# Patient Record
Sex: FEMALE | Race: ASIAN | NOT HISPANIC OR LATINO | URBAN - METROPOLITAN AREA
[De-identification: names, ages, dates, MRNs, and addresses within clinical notes are randomized per-mention and may not be internally consistent; named-entity substitution may affect disease eponyms.]

---

## 2022-01-17 ENCOUNTER — INPATIENT (INPATIENT)
Facility: HOSPITAL | Age: 62
LOS: 9 days | Discharge: HOME | DRG: 177 | End: 2022-01-27
Attending: STUDENT IN AN ORGANIZED HEALTH CARE EDUCATION/TRAINING PROGRAM | Admitting: FAMILY MEDICINE
Payer: MEDICAID

## 2022-01-17 VITALS
HEIGHT: 65 IN | SYSTOLIC BLOOD PRESSURE: 170 MMHG | DIASTOLIC BLOOD PRESSURE: 82 MMHG | HEART RATE: 82 BPM | WEIGHT: 169.98 LBS | TEMPERATURE: 99 F | OXYGEN SATURATION: 85 % | RESPIRATION RATE: 16 BRPM

## 2022-01-17 DIAGNOSIS — U07.1 COVID-19: ICD-10-CM

## 2022-01-17 DIAGNOSIS — I10 ESSENTIAL (PRIMARY) HYPERTENSION: ICD-10-CM

## 2022-01-17 DIAGNOSIS — E11.9 TYPE 2 DIABETES MELLITUS WITHOUT COMPLICATIONS: ICD-10-CM

## 2022-01-17 DIAGNOSIS — J96.01 ACUTE RESPIRATORY FAILURE WITH HYPOXIA: ICD-10-CM

## 2022-01-17 DIAGNOSIS — E78.5 HYPERLIPIDEMIA, UNSPECIFIED: ICD-10-CM

## 2022-01-17 DIAGNOSIS — Z29.9 ENCOUNTER FOR PROPHYLACTIC MEASURES, UNSPECIFIED: ICD-10-CM

## 2022-01-17 LAB
ALBUMIN SERPL ELPH-MCNC: 2.7 G/DL — LOW (ref 3.3–5)
ALBUMIN SERPL ELPH-MCNC: 2.8 G/DL — LOW (ref 3.3–5)
ALP SERPL-CCNC: 51 U/L — SIGNIFICANT CHANGE UP (ref 40–120)
ALP SERPL-CCNC: 52 U/L — SIGNIFICANT CHANGE UP (ref 40–120)
ALT FLD-CCNC: 48 U/L — SIGNIFICANT CHANGE UP (ref 12–78)
ALT FLD-CCNC: 50 U/L — SIGNIFICANT CHANGE UP (ref 12–78)
ANION GAP SERPL CALC-SCNC: 5 MMOL/L — SIGNIFICANT CHANGE UP (ref 5–17)
APPEARANCE UR: CLEAR — SIGNIFICANT CHANGE UP
APTT BLD: 30.2 SEC — SIGNIFICANT CHANGE UP (ref 27.5–35.5)
AST SERPL-CCNC: 87 U/L — HIGH (ref 15–37)
AST SERPL-CCNC: 87 U/L — HIGH (ref 15–37)
B PERT DNA SPEC QL NAA+PROBE: SIGNIFICANT CHANGE UP
BASOPHILS # BLD AUTO: 0 K/UL — SIGNIFICANT CHANGE UP (ref 0–0.2)
BASOPHILS NFR BLD AUTO: 0 % — SIGNIFICANT CHANGE UP (ref 0–2)
BILIRUB DIRECT SERPL-MCNC: 0.1 MG/DL — SIGNIFICANT CHANGE UP (ref 0–0.3)
BILIRUB INDIRECT FLD-MCNC: 0.2 MG/DL — SIGNIFICANT CHANGE UP (ref 0.2–1)
BILIRUB SERPL-MCNC: 0.3 MG/DL — SIGNIFICANT CHANGE UP (ref 0.2–1.2)
BILIRUB SERPL-MCNC: 0.4 MG/DL — SIGNIFICANT CHANGE UP (ref 0.2–1.2)
BILIRUB UR-MCNC: NEGATIVE — SIGNIFICANT CHANGE UP
BUN SERPL-MCNC: 13 MG/DL — SIGNIFICANT CHANGE UP (ref 7–23)
C PNEUM DNA SPEC QL NAA+PROBE: SIGNIFICANT CHANGE UP
CALCIUM SERPL-MCNC: 8.8 MG/DL — SIGNIFICANT CHANGE UP (ref 8.5–10.1)
CHLORIDE SERPL-SCNC: 104 MMOL/L — SIGNIFICANT CHANGE UP (ref 96–108)
CO2 SERPL-SCNC: 26 MMOL/L — SIGNIFICANT CHANGE UP (ref 22–31)
COLOR SPEC: SIGNIFICANT CHANGE UP
CREAT SERPL-MCNC: 0.98 MG/DL — SIGNIFICANT CHANGE UP (ref 0.5–1.3)
CREAT SERPL-MCNC: 1 MG/DL — SIGNIFICANT CHANGE UP (ref 0.5–1.3)
D DIMER BLD IA.RAPID-MCNC: 624 NG/ML DDU — HIGH
DIFF PNL FLD: ABNORMAL
EOSINOPHIL # BLD AUTO: 0 K/UL — SIGNIFICANT CHANGE UP (ref 0–0.5)
EOSINOPHIL NFR BLD AUTO: 0 % — SIGNIFICANT CHANGE UP (ref 0–6)
FERRITIN SERPL-MCNC: 882 NG/ML — HIGH (ref 15–150)
FLUAV H1 2009 PAND RNA SPEC QL NAA+PROBE: SIGNIFICANT CHANGE UP
FLUAV H1 RNA SPEC QL NAA+PROBE: SIGNIFICANT CHANGE UP
FLUAV H3 RNA SPEC QL NAA+PROBE: SIGNIFICANT CHANGE UP
FLUAV SUBTYP SPEC NAA+PROBE: SIGNIFICANT CHANGE UP
FLUBV RNA SPEC QL NAA+PROBE: SIGNIFICANT CHANGE UP
GLUCOSE SERPL-MCNC: 93 MG/DL — SIGNIFICANT CHANGE UP (ref 70–99)
GLUCOSE UR QL: NEGATIVE — SIGNIFICANT CHANGE UP
HADV DNA SPEC QL NAA+PROBE: SIGNIFICANT CHANGE UP
HCOV PNL SPEC NAA+PROBE: SIGNIFICANT CHANGE UP
HCT VFR BLD CALC: 37.4 % — SIGNIFICANT CHANGE UP (ref 34.5–45)
HGB BLD-MCNC: 13 G/DL — SIGNIFICANT CHANGE UP (ref 11.5–15.5)
HMPV RNA SPEC QL NAA+PROBE: SIGNIFICANT CHANGE UP
HPIV1 RNA SPEC QL NAA+PROBE: SIGNIFICANT CHANGE UP
HPIV2 RNA SPEC QL NAA+PROBE: SIGNIFICANT CHANGE UP
HPIV3 RNA SPEC QL NAA+PROBE: SIGNIFICANT CHANGE UP
HPIV4 RNA SPEC QL NAA+PROBE: SIGNIFICANT CHANGE UP
INR BLD: 0.97 RATIO — SIGNIFICANT CHANGE UP (ref 0.88–1.16)
INR BLD: 1.03 RATIO — SIGNIFICANT CHANGE UP (ref 0.88–1.16)
KETONES UR-MCNC: NEGATIVE — SIGNIFICANT CHANGE UP
LACTATE SERPL-SCNC: 0.9 MMOL/L — SIGNIFICANT CHANGE UP (ref 0.7–2)
LEUKOCYTE ESTERASE UR-ACNC: NEGATIVE — SIGNIFICANT CHANGE UP
LYMPHOCYTES # BLD AUTO: 0.3 K/UL — LOW (ref 1–3.3)
LYMPHOCYTES # BLD AUTO: 6 % — LOW (ref 13–44)
MCHC RBC-ENTMCNC: 30.5 PG — SIGNIFICANT CHANGE UP (ref 27–34)
MCHC RBC-ENTMCNC: 34.8 GM/DL — SIGNIFICANT CHANGE UP (ref 32–36)
MCV RBC AUTO: 87.8 FL — SIGNIFICANT CHANGE UP (ref 80–100)
MONOCYTES # BLD AUTO: 0.1 K/UL — SIGNIFICANT CHANGE UP (ref 0–0.9)
MONOCYTES NFR BLD AUTO: 2 % — SIGNIFICANT CHANGE UP (ref 2–14)
NEUTROPHILS # BLD AUTO: 4.53 K/UL — SIGNIFICANT CHANGE UP (ref 1.8–7.4)
NEUTROPHILS NFR BLD AUTO: 83 % — HIGH (ref 43–77)
NITRITE UR-MCNC: NEGATIVE — SIGNIFICANT CHANGE UP
NRBC # BLD: SIGNIFICANT CHANGE UP /100 WBCS (ref 0–0)
PH UR: 7 — SIGNIFICANT CHANGE UP (ref 5–8)
PLATELET # BLD AUTO: 169 K/UL — SIGNIFICANT CHANGE UP (ref 150–400)
POTASSIUM SERPL-MCNC: 3.7 MMOL/L — SIGNIFICANT CHANGE UP (ref 3.5–5.3)
POTASSIUM SERPL-SCNC: 3.7 MMOL/L — SIGNIFICANT CHANGE UP (ref 3.5–5.3)
PROCALCITONIN SERPL-MCNC: 0.08 NG/ML — HIGH (ref 0–0.04)
PROT SERPL-MCNC: 7 G/DL — SIGNIFICANT CHANGE UP (ref 6–8.3)
PROT SERPL-MCNC: 7.1 G/DL — SIGNIFICANT CHANGE UP (ref 6–8.3)
PROT UR-MCNC: 100
PROTHROM AB SERPL-ACNC: 11.4 SEC — SIGNIFICANT CHANGE UP (ref 10.6–13.6)
PROTHROM AB SERPL-ACNC: 12 SEC — SIGNIFICANT CHANGE UP (ref 10.6–13.6)
RAPID RVP RESULT: DETECTED
RBC # BLD: 4.26 M/UL — SIGNIFICANT CHANGE UP (ref 3.8–5.2)
RBC # FLD: 13 % — SIGNIFICANT CHANGE UP (ref 10.3–14.5)
RV+EV RNA SPEC QL NAA+PROBE: SIGNIFICANT CHANGE UP
SARS-COV-2 RNA SPEC QL NAA+PROBE: DETECTED
SODIUM SERPL-SCNC: 135 MMOL/L — SIGNIFICANT CHANGE UP (ref 135–145)
SP GR SPEC: 1 — LOW (ref 1.01–1.02)
TROPONIN I, HIGH SENSITIVITY RESULT: 19.8 NG/L — SIGNIFICANT CHANGE UP
UROBILINOGEN FLD QL: NEGATIVE — SIGNIFICANT CHANGE UP
WBC # BLD: 4.98 K/UL — SIGNIFICANT CHANGE UP (ref 3.8–10.5)
WBC # FLD AUTO: 4.98 K/UL — SIGNIFICANT CHANGE UP (ref 3.8–10.5)

## 2022-01-17 PROCEDURE — 71045 X-RAY EXAM CHEST 1 VIEW: CPT | Mod: 26

## 2022-01-17 PROCEDURE — 99285 EMERGENCY DEPT VISIT HI MDM: CPT

## 2022-01-17 PROCEDURE — 71275 CT ANGIOGRAPHY CHEST: CPT | Mod: 26,MA

## 2022-01-17 PROCEDURE — 99223 1ST HOSP IP/OBS HIGH 75: CPT

## 2022-01-17 RX ORDER — INSULIN LISPRO 100/ML
VIAL (ML) SUBCUTANEOUS AT BEDTIME
Refills: 0 | Status: DISCONTINUED | OUTPATIENT
Start: 2022-01-17 | End: 2022-01-22

## 2022-01-17 RX ORDER — AMLODIPINE BESYLATE 2.5 MG/1
1 TABLET ORAL
Qty: 0 | Refills: 0 | DISCHARGE

## 2022-01-17 RX ORDER — SODIUM CHLORIDE 9 MG/ML
1000 INJECTION, SOLUTION INTRAVENOUS
Refills: 0 | Status: DISCONTINUED | OUTPATIENT
Start: 2022-01-17 | End: 2022-01-27

## 2022-01-17 RX ORDER — DEXTROSE 50 % IN WATER 50 %
25 SYRINGE (ML) INTRAVENOUS ONCE
Refills: 0 | Status: DISCONTINUED | OUTPATIENT
Start: 2022-01-17 | End: 2022-01-27

## 2022-01-17 RX ORDER — DEXAMETHASONE 0.5 MG/5ML
6 ELIXIR ORAL ONCE
Refills: 0 | Status: COMPLETED | OUTPATIENT
Start: 2022-01-17 | End: 2022-01-17

## 2022-01-17 RX ORDER — DEXAMETHASONE 0.5 MG/5ML
6 ELIXIR ORAL DAILY
Refills: 0 | Status: COMPLETED | OUTPATIENT
Start: 2022-01-18 | End: 2022-01-26

## 2022-01-17 RX ORDER — METOPROLOL TARTRATE 50 MG
1 TABLET ORAL
Qty: 0 | Refills: 0 | DISCHARGE

## 2022-01-17 RX ORDER — REMDESIVIR 5 MG/ML
200 INJECTION INTRAVENOUS EVERY 24 HOURS
Refills: 0 | Status: COMPLETED | OUTPATIENT
Start: 2022-01-17 | End: 2022-01-17

## 2022-01-17 RX ORDER — GLUCAGON INJECTION, SOLUTION 0.5 MG/.1ML
1 INJECTION, SOLUTION SUBCUTANEOUS ONCE
Refills: 0 | Status: DISCONTINUED | OUTPATIENT
Start: 2022-01-17 | End: 2022-01-27

## 2022-01-17 RX ORDER — REMDESIVIR 5 MG/ML
INJECTION INTRAVENOUS
Refills: 0 | Status: COMPLETED | OUTPATIENT
Start: 2022-01-17 | End: 2022-01-21

## 2022-01-17 RX ORDER — AMLODIPINE BESYLATE 2.5 MG/1
10 TABLET ORAL DAILY
Refills: 0 | Status: DISCONTINUED | OUTPATIENT
Start: 2022-01-17 | End: 2022-01-27

## 2022-01-17 RX ORDER — ASPIRIN/CALCIUM CARB/MAGNESIUM 324 MG
1 TABLET ORAL
Qty: 0 | Refills: 0 | DISCHARGE

## 2022-01-17 RX ORDER — ENOXAPARIN SODIUM 100 MG/ML
40 INJECTION SUBCUTANEOUS DAILY
Refills: 0 | Status: DISCONTINUED | OUTPATIENT
Start: 2022-01-17 | End: 2022-01-18

## 2022-01-17 RX ORDER — LOSARTAN POTASSIUM 100 MG/1
1 TABLET, FILM COATED ORAL
Qty: 0 | Refills: 0 | DISCHARGE

## 2022-01-17 RX ORDER — DEXTROSE 50 % IN WATER 50 %
12.5 SYRINGE (ML) INTRAVENOUS ONCE
Refills: 0 | Status: DISCONTINUED | OUTPATIENT
Start: 2022-01-17 | End: 2022-01-27

## 2022-01-17 RX ORDER — LOSARTAN POTASSIUM 100 MG/1
100 TABLET, FILM COATED ORAL DAILY
Refills: 0 | Status: DISCONTINUED | OUTPATIENT
Start: 2022-01-17 | End: 2022-01-23

## 2022-01-17 RX ORDER — SODIUM CHLORIDE 9 MG/ML
1750 INJECTION INTRAMUSCULAR; INTRAVENOUS; SUBCUTANEOUS ONCE
Refills: 0 | Status: COMPLETED | OUTPATIENT
Start: 2022-01-17 | End: 2022-01-17

## 2022-01-17 RX ORDER — ASPIRIN/CALCIUM CARB/MAGNESIUM 324 MG
0 TABLET ORAL
Qty: 0 | Refills: 0 | DISCHARGE

## 2022-01-17 RX ORDER — INSULIN LISPRO 100/ML
VIAL (ML) SUBCUTANEOUS
Refills: 0 | Status: DISCONTINUED | OUTPATIENT
Start: 2022-01-17 | End: 2022-01-22

## 2022-01-17 RX ORDER — ACETAMINOPHEN 500 MG
650 TABLET ORAL ONCE
Refills: 0 | Status: COMPLETED | OUTPATIENT
Start: 2022-01-17 | End: 2022-01-17

## 2022-01-17 RX ORDER — METOPROLOL TARTRATE 50 MG
50 TABLET ORAL
Refills: 0 | Status: DISCONTINUED | OUTPATIENT
Start: 2022-01-17 | End: 2022-01-27

## 2022-01-17 RX ORDER — SIMVASTATIN 20 MG/1
1 TABLET, FILM COATED ORAL
Qty: 0 | Refills: 0 | DISCHARGE

## 2022-01-17 RX ORDER — SITAGLIPTIN 50 MG/1
1 TABLET, FILM COATED ORAL
Qty: 0 | Refills: 0 | DISCHARGE

## 2022-01-17 RX ORDER — PIOGLITAZONE HYDROCHLORIDE 15 MG/1
1 TABLET ORAL
Qty: 0 | Refills: 0 | DISCHARGE

## 2022-01-17 RX ORDER — ASPIRIN/CALCIUM CARB/MAGNESIUM 324 MG
81 TABLET ORAL DAILY
Refills: 0 | Status: DISCONTINUED | OUTPATIENT
Start: 2022-01-17 | End: 2022-01-27

## 2022-01-17 RX ORDER — DEXTROSE 50 % IN WATER 50 %
15 SYRINGE (ML) INTRAVENOUS ONCE
Refills: 0 | Status: DISCONTINUED | OUTPATIENT
Start: 2022-01-17 | End: 2022-01-27

## 2022-01-17 RX ORDER — HYDROCHLOROTHIAZIDE 25 MG
25 TABLET ORAL DAILY
Refills: 0 | Status: DISCONTINUED | OUTPATIENT
Start: 2022-01-17 | End: 2022-01-23

## 2022-01-17 RX ORDER — REMDESIVIR 5 MG/ML
100 INJECTION INTRAVENOUS EVERY 24 HOURS
Refills: 0 | Status: COMPLETED | OUTPATIENT
Start: 2022-01-18 | End: 2022-01-21

## 2022-01-17 RX ADMIN — Medication 6 MILLIGRAM(S): at 14:50

## 2022-01-17 RX ADMIN — REMDESIVIR 500 MILLIGRAM(S): 5 INJECTION INTRAVENOUS at 21:05

## 2022-01-17 RX ADMIN — Medication 650 MILLIGRAM(S): at 14:50

## 2022-01-17 RX ADMIN — Medication 650 MILLIGRAM(S): at 18:12

## 2022-01-17 RX ADMIN — SODIUM CHLORIDE 1750 MILLILITER(S): 9 INJECTION INTRAMUSCULAR; INTRAVENOUS; SUBCUTANEOUS at 14:50

## 2022-01-17 NOTE — H&P ADULT - HISTORY OF PRESENT ILLNESS
60 yo F with pmh Dm2 on insulin. HTN, HLD who presents with 1 week of dry cough, intermittent chest pain induced by coughing, myalgias and temps of 100. Pt states her son is also covid +. States she has note received covid vaccine nor wants it if offered as for Restorationist reasons as a Church her priests have told her not to get it. Pt denies any other acute complaints at this time.     In the ED pt found to be hypoxic in the 80s, started on O2 supplementation, satting 91% on 5.5 L of NC O2 with some mild wob on exertion. CXR c/w COVID PNA. CTA neg for PE 62 yo obese F with pmh Dm2 on insulin. HTN, HLD who presents with 1 week of dry cough, intermittent chest pain induced by coughing, myalgias and temps of 100. Pt states her son is also covid +. States she has note received covid vaccine nor wants it if offered as for Spiritism reasons as a Jain her priests have told her not to get it. Pt denies any other acute complaints at this time.     In the ED pt found to be hypoxic in the 80s, started on O2 supplementation, satting 91% on 5.5 L of NC O2 with some mild wob on exertion. CXR c/w COVID PNA. CTA neg for PE

## 2022-01-17 NOTE — ED PROVIDER NOTE - IV ALTEPLASE INCLUSION HIDDEN
Patient told to  rx at  js left message js  
Pt need refill and please tell him he needs to set up his appt per his mommy  
show

## 2022-01-17 NOTE — ED PROVIDER NOTE - MUSCULOSKELETAL, MLM
Spine appears normal, range of motion is not limited, no muscle or joint tenderness. BLE no edema or tenderness.

## 2022-01-17 NOTE — ED PROVIDER NOTE - ATTENDING CONTRIBUTION TO CARE
Pt is a 62 yo female who presents to the ED with a cc of viral URI symptoms. PMHx of DM, HTN. Pt reports that for the last week she has been experiencing weakness, fatigue, fevers, chills, N/D, chest discomfort, SOB, cough, and lower abd pain. She reports that her son whom she lives with tested positive for COVID with an at home test. She has been taking Tylenol and Advil for her symptoms with no real improvement. She noted increased SOB and so came to the ED. On arrival pt ws found to be hypoxic. Denies V/C, ext numbness or weakness. Pt has not been vaccinated for COVID 19. On exam pt lying in bed appears fatigued but in NAD, NCAT, dry MM, heart RR, lungs CTA, abd soft NT/ND. No focal neurological deficits. Pt presenting with hypoxia in the setting of suspected COVID 19. Will obtain screening labs, chest x-ray, EKG. Will swab for COVID 19 and monitor

## 2022-01-17 NOTE — H&P ADULT - ASSESSMENT
62 yo obese F with pmh Dm2 on insulin. HTN, HLD who presents with 1 week of dry cough, intermittent chest pain induced by coughing, myalgias admitted with acute respiratory failure with hypoxia sec to COVID PNA

## 2022-01-17 NOTE — H&P ADULT - NSICDXPASTMEDICALHX_GEN_ALL_CORE_FT
PAST MEDICAL HISTORY:  DM (diabetes mellitus) type 2-insulin dependenct    HTN (hypertension) resistant type,, >3 bp meds

## 2022-01-17 NOTE — ED ADULT NURSE REASSESSMENT NOTE - NS ED NURSE REASSESS COMMENT FT1
Pt is AOx4 has covid on nasal cannula 5L. Pt states she feels a little SOB with a sore throat and a cough. Pt states she is comfortable though. No s/s of distress noted at this time. VSS will reassess.

## 2022-01-17 NOTE — H&P ADULT - NSHPADDITIONALINFOADULT_GEN_ALL_CORE
r/o acs as pt endorsing some chest pressure=check 2nd trop, repeat ekg, monitor on tele, consider cardio if persists

## 2022-01-17 NOTE — ED PROVIDER NOTE - OBJECTIVE STATEMENT
Rikki [id number]    61 F hx HTN, DM presents to ED for low O2 (80s), cough, SOB. States has been sick for the past week. Fever, CP, SOB, nausea, diarrhea, cough. Son at home with suspected COVID infection. Pt has been taking tylenol/advil for symptoms, none today. Nonsmoker.    No COVID vaccine  PMD Sung

## 2022-01-17 NOTE — H&P ADULT - PROBLEM SELECTOR PLAN 2
pt unvacinnated  cont decadron  apprec ID recs Dr Paredes  apprec pulm recs dr dunn  monitor resp course  start remdesmivir  assume today is day 7 as one week of onset of symptoms

## 2022-01-17 NOTE — H&P ADULT - PROBLEM SELECTOR PLAN 4
chronic, stable, resistant type 4 types of antihtn at home  cont home meds with hold parameters  monitor renal function  dash diet

## 2022-01-17 NOTE — H&P ADULT - NSICDXFAMILYHX_GEN_ALL_CORE_FT
FAMILY HISTORY:  Father  Still living? Unknown  FH: HTN (hypertension), Age at diagnosis: Age Unknown  FHx: type 2 diabetes mellitus, Age at diagnosis: Age Unknown    Mother  Still living? No  FH: HTN (hypertension), Age at diagnosis: Age Unknown  FHx: type 2 diabetes mellitus, Age at diagnosis: Age Unknown

## 2022-01-17 NOTE — ED ADULT NURSE NOTE - OBJECTIVE STATEMENT
Received the patient in the Er. c/O Fever, cough and dry mouth. Son is diagnosed with covid positive. unvaccinated.

## 2022-01-17 NOTE — H&P ADULT - NSHPSOCIALHISTORY_GEN_ALL_CORE
lives in a home in [place] with her two children in their 20s to 30s, nonsmoker, denies etoh use, denies ilicit drug use, retired , , has not had the flu shot, thinks she may have a PNA vax, declines covid vaccine for Christian reasons and not interested in pursuing

## 2022-01-18 LAB
A1C WITH ESTIMATED AVERAGE GLUCOSE RESULT: 6.7 % — HIGH (ref 4–5.6)
ALBUMIN SERPL ELPH-MCNC: 2.5 G/DL — LOW (ref 3.3–5)
ALBUMIN SERPL ELPH-MCNC: 2.5 G/DL — LOW (ref 3.3–5)
ALP SERPL-CCNC: 50 U/L — SIGNIFICANT CHANGE UP (ref 40–120)
ALP SERPL-CCNC: 50 U/L — SIGNIFICANT CHANGE UP (ref 40–120)
ALT FLD-CCNC: 48 U/L — SIGNIFICANT CHANGE UP (ref 12–78)
ALT FLD-CCNC: 49 U/L — SIGNIFICANT CHANGE UP (ref 12–78)
ANION GAP SERPL CALC-SCNC: 6 MMOL/L — SIGNIFICANT CHANGE UP (ref 5–17)
AST SERPL-CCNC: 81 U/L — HIGH (ref 15–37)
AST SERPL-CCNC: 87 U/L — HIGH (ref 15–37)
BASOPHILS # BLD AUTO: 0 K/UL — SIGNIFICANT CHANGE UP (ref 0–0.2)
BASOPHILS NFR BLD AUTO: 0 % — SIGNIFICANT CHANGE UP (ref 0–2)
BILIRUB DIRECT SERPL-MCNC: <0.1 MG/DL — SIGNIFICANT CHANGE UP (ref 0–0.3)
BILIRUB INDIRECT FLD-MCNC: >0.2 MG/DL — SIGNIFICANT CHANGE UP (ref 0.2–1)
BILIRUB SERPL-MCNC: 0.3 MG/DL — SIGNIFICANT CHANGE UP (ref 0.2–1.2)
BILIRUB SERPL-MCNC: 0.4 MG/DL — SIGNIFICANT CHANGE UP (ref 0.2–1.2)
BUN SERPL-MCNC: 16 MG/DL — SIGNIFICANT CHANGE UP (ref 7–23)
CALCIUM SERPL-MCNC: 8.9 MG/DL — SIGNIFICANT CHANGE UP (ref 8.5–10.1)
CHLORIDE SERPL-SCNC: 108 MMOL/L — SIGNIFICANT CHANGE UP (ref 96–108)
CO2 SERPL-SCNC: 25 MMOL/L — SIGNIFICANT CHANGE UP (ref 22–31)
CREAT SERPL-MCNC: 0.78 MG/DL — SIGNIFICANT CHANGE UP (ref 0.5–1.3)
CREAT SERPL-MCNC: 0.9 MG/DL — SIGNIFICANT CHANGE UP (ref 0.5–1.3)
CRP SERPL-MCNC: 46 MG/L — HIGH
CULTURE RESULTS: SIGNIFICANT CHANGE UP
EOSINOPHIL # BLD AUTO: 0 K/UL — SIGNIFICANT CHANGE UP (ref 0–0.5)
EOSINOPHIL NFR BLD AUTO: 0 % — SIGNIFICANT CHANGE UP (ref 0–6)
ESTIMATED AVERAGE GLUCOSE: 146 MG/DL — HIGH (ref 68–114)
GLUCOSE SERPL-MCNC: 118 MG/DL — HIGH (ref 70–99)
HCT VFR BLD CALC: 38.2 % — SIGNIFICANT CHANGE UP (ref 34.5–45)
HCV AB S/CO SERPL IA: 0.17 S/CO — SIGNIFICANT CHANGE UP (ref 0–0.99)
HCV AB SERPL-IMP: SIGNIFICANT CHANGE UP
HGB BLD-MCNC: 12.8 G/DL — SIGNIFICANT CHANGE UP (ref 11.5–15.5)
INR BLD: 1.02 RATIO — SIGNIFICANT CHANGE UP (ref 0.88–1.16)
LYMPHOCYTES # BLD AUTO: 0.28 K/UL — LOW (ref 1–3.3)
LYMPHOCYTES # BLD AUTO: 6 % — LOW (ref 13–44)
MCHC RBC-ENTMCNC: 30.1 PG — SIGNIFICANT CHANGE UP (ref 27–34)
MCHC RBC-ENTMCNC: 33.5 GM/DL — SIGNIFICANT CHANGE UP (ref 32–36)
MCV RBC AUTO: 89.9 FL — SIGNIFICANT CHANGE UP (ref 80–100)
MONOCYTES # BLD AUTO: 0.33 K/UL — SIGNIFICANT CHANGE UP (ref 0–0.9)
MONOCYTES NFR BLD AUTO: 7 % — SIGNIFICANT CHANGE UP (ref 2–14)
NEUTROPHILS # BLD AUTO: 3.95 K/UL — SIGNIFICANT CHANGE UP (ref 1.8–7.4)
NEUTROPHILS NFR BLD AUTO: 72 % — SIGNIFICANT CHANGE UP (ref 43–77)
NRBC # BLD: SIGNIFICANT CHANGE UP /100 WBCS (ref 0–0)
PLATELET # BLD AUTO: 175 K/UL — SIGNIFICANT CHANGE UP (ref 150–400)
POTASSIUM SERPL-MCNC: 4 MMOL/L — SIGNIFICANT CHANGE UP (ref 3.5–5.3)
POTASSIUM SERPL-SCNC: 4 MMOL/L — SIGNIFICANT CHANGE UP (ref 3.5–5.3)
PROT SERPL-MCNC: 6.7 G/DL — SIGNIFICANT CHANGE UP (ref 6–8.3)
PROT SERPL-MCNC: 6.8 G/DL — SIGNIFICANT CHANGE UP (ref 6–8.3)
PROTHROM AB SERPL-ACNC: 11.9 SEC — SIGNIFICANT CHANGE UP (ref 10.6–13.6)
RBC # BLD: 4.25 M/UL — SIGNIFICANT CHANGE UP (ref 3.8–5.2)
RBC # FLD: 13 % — SIGNIFICANT CHANGE UP (ref 10.3–14.5)
SODIUM SERPL-SCNC: 139 MMOL/L — SIGNIFICANT CHANGE UP (ref 135–145)
SPECIMEN SOURCE: SIGNIFICANT CHANGE UP
TROPONIN I, HIGH SENSITIVITY RESULT: 16.3 NG/L — SIGNIFICANT CHANGE UP
WBC # BLD: 4.65 K/UL — SIGNIFICANT CHANGE UP (ref 3.8–10.5)
WBC # FLD AUTO: 4.65 K/UL — SIGNIFICANT CHANGE UP (ref 3.8–10.5)

## 2022-01-18 PROCEDURE — 99232 SBSQ HOSP IP/OBS MODERATE 35: CPT

## 2022-01-18 PROCEDURE — 99223 1ST HOSP IP/OBS HIGH 75: CPT

## 2022-01-18 PROCEDURE — 93010 ELECTROCARDIOGRAM REPORT: CPT

## 2022-01-18 RX ORDER — SIMVASTATIN 20 MG/1
20 TABLET, FILM COATED ORAL AT BEDTIME
Refills: 0 | Status: DISCONTINUED | OUTPATIENT
Start: 2022-01-18 | End: 2022-01-27

## 2022-01-18 RX ORDER — ENOXAPARIN SODIUM 100 MG/ML
80 INJECTION SUBCUTANEOUS
Refills: 0 | Status: DISCONTINUED | OUTPATIENT
Start: 2022-01-19 | End: 2022-01-24

## 2022-01-18 RX ADMIN — Medication 200 MILLIGRAM(S): at 06:47

## 2022-01-18 RX ADMIN — Medication 4: at 12:12

## 2022-01-18 RX ADMIN — ENOXAPARIN SODIUM 40 MILLIGRAM(S): 100 INJECTION SUBCUTANEOUS at 12:12

## 2022-01-18 RX ADMIN — SIMVASTATIN 20 MILLIGRAM(S): 20 TABLET, FILM COATED ORAL at 21:58

## 2022-01-18 RX ADMIN — Medication 6 MILLIGRAM(S): at 06:46

## 2022-01-18 RX ADMIN — Medication 25 MILLIGRAM(S): at 06:46

## 2022-01-18 RX ADMIN — Medication 50 MILLIGRAM(S): at 16:52

## 2022-01-18 RX ADMIN — Medication 30 MILLILITER(S): at 17:35

## 2022-01-18 RX ADMIN — AMLODIPINE BESYLATE 10 MILLIGRAM(S): 2.5 TABLET ORAL at 06:46

## 2022-01-18 RX ADMIN — Medication 81 MILLIGRAM(S): at 12:12

## 2022-01-18 RX ADMIN — Medication 6: at 16:54

## 2022-01-18 RX ADMIN — LOSARTAN POTASSIUM 100 MILLIGRAM(S): 100 TABLET, FILM COATED ORAL at 06:47

## 2022-01-18 RX ADMIN — Medication 50 MILLIGRAM(S): at 06:46

## 2022-01-18 RX ADMIN — Medication 200 MILLIGRAM(S): at 16:52

## 2022-01-18 RX ADMIN — REMDESIVIR 500 MILLIGRAM(S): 5 INJECTION INTRAVENOUS at 21:58

## 2022-01-18 NOTE — PROGRESS NOTE ADULT - ASSESSMENT
62 yo obese F with pmh Dm2 on insulin. HTN, HLD who presents with 1 week of dry cough, intermittent chest pain induced by coughing, myalgias admitted with acute respiratory failure with hypoxia sec to COVID PNA       Problem/Plan - 1:  ·  Problem: Acute respiratory failure with hypoxia.   ·  Plan: monitor resp status  apprec pulm recs  maintain sats above 88% with O2 supplemental support.     Problem/Plan - 2:  ·  Problem: Pneumonia due to COVID-19 virus.   ·  Plan: pt unvacinnated  cont decadron  apprec ID recs Dr Paredes  apprec pulm recs dr dunn  start remdesmivir  assume today is day 7 as one week of onset of symptoms.     Problem/Plan - 3:  ·  Problem: Type 2 diabetes mellitus.   ·  Plan: chronic, stable  start mdiss with hypgolycemic protocol  diabetic diet.     Problem/Plan - 4:  ·  Problem: HTN (hypertension).   cont home meds with hold parameters  Amlodipine 10mg po daily   HCTZ 25mg po daily   Metoprolol 50mg po bid      Problem/Plan - 5:  ·  Problem: HLD (hyperlipidemia).   ·  Plan: chronic, stable  Aspirin 81mg daily   Simvastatin 20mg po daily      Problem/Plan - 6:  ·  Problem: Need for prophylactic measure.   ·  Plan: lovenox 40mg sq qd as per covid protocol.  Maalox for dyspeptic symptoms.   PT , Dc planning

## 2022-01-18 NOTE — CONSULT NOTE ADULT - TIME BILLING
Thank you for your consult.   Please call us with any concerns or questions.   We will continue to follow.     Frantz Darby MD   Division of Infectious Diseases  [facility]   Cell [phone] between 8am and 6pm   After 6pm and weekends please call ID service at [phone].

## 2022-01-18 NOTE — PROGRESS NOTE ADULT - SUBJECTIVE AND OBJECTIVE BOX
CC: Covid pneumonia  Acute hypoxic resp failure.   Still requiring 5L NC to maintain saturation above 90%  HPI:  60 yo obese F with pmh Dm2 on insulin. HTN, HLD who presents with 1 week of dry cough, intermittent chest pain induced by coughing, myalgias and temps of 100. Pt states her son is also covid +. States she has note received covid vaccine nor wants it if offered as for Jehovah's witness reasons as a Jain her priests have told her not to get it. Pt denies any other acute complaints at this time.     In the ED pt found to be hypoxic in the 80s, started on O2 supplementation, satting 91% on 5.5 L of NC O2 with some mild wob on exertion. CXR c/w COVID PNA. CTA neg for PE (2022 18:42)    REVIEW OF SYSTEMS:    Patient denied fever, chills, abdominal pain, nausea, vomiting, cough, shortness of breath, chest pain or palpitations    Vital Signs Last 24 Hrs  T(C): 36.9 (2022 11:32), Max: 37.1 (2022 20:57)  T(F): 98.4 (2022 11:32), Max: 98.8 (2022 20:57)  HR: 80 (2022 16:47) (75 - 80)  BP: 150/77 (2022 16:47) (148/72 - 164/74)  BP(mean): --  RR: 18 (2022 11:32) (18 - 18)  SpO2: 91% (2022 11:32) (91% - 94%)I&O's Summary    PHYSICAL EXAM:  GENERAL: NAD, well-groomed  HEENT: PERRL, +EOMI, anicteric, no [place]  NECK: Supple, No JVD   CHEST/LUNG: CTA bilaterally; Normal effort  HEART: S1S2 Normal intensity, no murmurs, gallops or rubs noted  ABDOMEN: Soft, BS Normoactive, NT, ND, no HSM noted  EXTREMITIES:  2+ radial and DP pulses noted, no clubbing, cyanosis, or edema noted, FROM x 4  SKIN: No rashes or lesions noted  NEURO: A&Ox3, no focal deficits noted, CN II-XII intact  PSYCH: Anxiety  mood and affect; insight/judgement appropriate  LABS:                        12.8   4.65  )-----------( 175      ( 2022 08:16 )             38.2     -18    139  |  108  |  16  ----------------------------<  118<H>  4.0   |  25  |  0.78    Ca    8.9      2022 08:16    TPro  6.8  /  Alb  2.5<L>  /  TBili  0.4  /  DBili  x   /  AST  87<H>  /  ALT  48  /  AlkPhos  50      PT/INR - ( 2022 01:01 )   PT: 11.9 sec;   INR: 1.02 ratio         PTT - ( 2022 14:09 )  PTT:30.2 sec  Urinalysis Basic - ( 2022 21:16 )    Color: Pale Yellow / Appearance: Clear / S.005 / pH: x  Gluc: x / Ketone: Negative  / Bili: Negative / Urobili: Negative   Blood: x / Protein: 100 / Nitrite: Negative   Leuk Esterase: Negative / RBC: 0-2 /HPF / WBC 0-2   Sq Epi: x / Non Sq Epi: Few / Bacteria: Occasional      RADIOLOGY & ADDITIONAL TESTS:    MEDICATIONS:  MEDICATIONS  (STANDING):  amLODIPine   Tablet 10 milliGRAM(s) Oral daily  aspirin enteric coated 81 milliGRAM(s) Oral daily  dexAMETHasone     Tablet 6 milliGRAM(s) Oral daily  dextrose 40% Gel 15 Gram(s) Oral once  dextrose 5%. 1000 milliLiter(s) (50 mL/Hr) IV Continuous <Continuous>  dextrose 5%. 1000 milliLiter(s) (100 mL/Hr) IV Continuous <Continuous>  dextrose 50% Injectable 25 Gram(s) IV Push once  dextrose 50% Injectable 12.5 Gram(s) IV Push once  dextrose 50% Injectable 25 Gram(s) IV Push once  enoxaparin Injectable 40 milliGRAM(s) SubCutaneous daily  glucagon  Injectable 1 milliGRAM(s) IntraMuscular once  hydrochlorothiazide 25 milliGRAM(s) Oral daily  insulin lispro (ADMELOG) corrective regimen sliding scale   SubCutaneous three times a day before meals  insulin lispro (ADMELOG) corrective regimen sliding scale   SubCutaneous at bedtime  losartan 100 milliGRAM(s) Oral daily  metoprolol tartrate 50 milliGRAM(s) Oral two times a day  remdesivir  IVPB 100 milliGRAM(s) IV Intermittent every 24 hours  remdesivir  IVPB   IV Intermittent     MEDICATIONS  (PRN):  aluminum hydroxide/magnesium hydroxide/simethicone Suspension 30 milliLiter(s) Oral every 4 hours PRN Dyspepsia  guaiFENesin Oral Liquid (Sugar-Free) 200 milliGRAM(s) Oral every 6 hours PRN Cough

## 2022-01-18 NOTE — PATIENT PROFILE ADULT - FALL HARM RISK - UNIVERSAL INTERVENTIONS
Bed in lowest position, wheels locked, appropriate side rails in place/Call bell, personal items and telephone in reach/Instruct patient to call for assistance before getting out of bed or chair/Non-slip footwear when patient is out of bed/[place] to call system/Physically safe environment - no spills, clutter or unnecessary equipment/Purposeful Proactive Rounding/Room/bathroom lighting operational, light cord in reach

## 2022-01-19 LAB
ALBUMIN SERPL ELPH-MCNC: 2.7 G/DL — LOW (ref 3.3–5)
ALP SERPL-CCNC: 61 U/L — SIGNIFICANT CHANGE UP (ref 40–120)
ALT FLD-CCNC: 52 U/L — SIGNIFICANT CHANGE UP (ref 12–78)
ANION GAP SERPL CALC-SCNC: 5 MMOL/L — SIGNIFICANT CHANGE UP (ref 5–17)
AST SERPL-CCNC: 84 U/L — HIGH (ref 15–37)
BILIRUB DIRECT SERPL-MCNC: <0.1 MG/DL — SIGNIFICANT CHANGE UP (ref 0–0.3)
BILIRUB INDIRECT FLD-MCNC: >0.3 MG/DL — SIGNIFICANT CHANGE UP (ref 0.2–1)
BILIRUB SERPL-MCNC: 0.4 MG/DL — SIGNIFICANT CHANGE UP (ref 0.2–1.2)
BUN SERPL-MCNC: 20 MG/DL — SIGNIFICANT CHANGE UP (ref 7–23)
CALCIUM SERPL-MCNC: 9.5 MG/DL — SIGNIFICANT CHANGE UP (ref 8.5–10.1)
CHLORIDE SERPL-SCNC: 103 MMOL/L — SIGNIFICANT CHANGE UP (ref 96–108)
CO2 SERPL-SCNC: 27 MMOL/L — SIGNIFICANT CHANGE UP (ref 22–31)
CREAT SERPL-MCNC: 0.85 MG/DL — SIGNIFICANT CHANGE UP (ref 0.5–1.3)
CREAT SERPL-MCNC: 0.97 MG/DL — SIGNIFICANT CHANGE UP (ref 0.5–1.3)
GLUCOSE SERPL-MCNC: 202 MG/DL — HIGH (ref 70–99)
INR BLD: 1.06 RATIO — SIGNIFICANT CHANGE UP (ref 0.88–1.16)
POTASSIUM SERPL-MCNC: 5.2 MMOL/L — SIGNIFICANT CHANGE UP (ref 3.5–5.3)
POTASSIUM SERPL-SCNC: 5.2 MMOL/L — SIGNIFICANT CHANGE UP (ref 3.5–5.3)
PROT SERPL-MCNC: 7 G/DL — SIGNIFICANT CHANGE UP (ref 6–8.3)
PROTHROM AB SERPL-ACNC: 12.4 SEC — SIGNIFICANT CHANGE UP (ref 10.6–13.6)
SODIUM SERPL-SCNC: 135 MMOL/L — SIGNIFICANT CHANGE UP (ref 135–145)

## 2022-01-19 PROCEDURE — 99233 SBSQ HOSP IP/OBS HIGH 50: CPT

## 2022-01-19 PROCEDURE — 99232 SBSQ HOSP IP/OBS MODERATE 35: CPT

## 2022-01-19 RX ADMIN — Medication 50 MILLIGRAM(S): at 17:06

## 2022-01-19 RX ADMIN — Medication 6 MILLIGRAM(S): at 05:20

## 2022-01-19 RX ADMIN — LOSARTAN POTASSIUM 100 MILLIGRAM(S): 100 TABLET, FILM COATED ORAL at 05:20

## 2022-01-19 RX ADMIN — Medication 50 MILLIGRAM(S): at 05:20

## 2022-01-19 RX ADMIN — ENOXAPARIN SODIUM 80 MILLIGRAM(S): 100 INJECTION SUBCUTANEOUS at 05:19

## 2022-01-19 RX ADMIN — Medication 25 MILLIGRAM(S): at 05:19

## 2022-01-19 RX ADMIN — Medication 4: at 17:05

## 2022-01-19 RX ADMIN — REMDESIVIR 500 MILLIGRAM(S): 5 INJECTION INTRAVENOUS at 21:38

## 2022-01-19 RX ADMIN — Medication 2: at 08:12

## 2022-01-19 RX ADMIN — AMLODIPINE BESYLATE 10 MILLIGRAM(S): 2.5 TABLET ORAL at 05:20

## 2022-01-19 RX ADMIN — Medication 2: at 12:01

## 2022-01-19 RX ADMIN — Medication 81 MILLIGRAM(S): at 12:01

## 2022-01-19 RX ADMIN — ENOXAPARIN SODIUM 80 MILLIGRAM(S): 100 INJECTION SUBCUTANEOUS at 17:07

## 2022-01-19 RX ADMIN — Medication 100 MILLIGRAM(S): at 21:38

## 2022-01-19 RX ADMIN — SIMVASTATIN 20 MILLIGRAM(S): 20 TABLET, FILM COATED ORAL at 21:38

## 2022-01-19 NOTE — PROGRESS NOTE ADULT - SUBJECTIVE AND OBJECTIVE BOX
Date/Time Patient Seen:  		  Referring MD:   Data Reviewed	       Patient is a 61y old  Female who presents with a chief complaint of cough, covid exposure (18 Jan 2022 16:58)      Subjective/HPI     PAST MEDICAL & SURGICAL HISTORY:  HTN (hypertension)  resistant type,, &gt;3 bp meds    DM (diabetes mellitus)  type 2-insulin dependenct    No significant past surgical history          Medication list         MEDICATIONS  (STANDING):  amLODIPine   Tablet 10 milliGRAM(s) Oral daily  aspirin enteric coated 81 milliGRAM(s) Oral daily  dexAMETHasone     Tablet 6 milliGRAM(s) Oral daily  dextrose 40% Gel 15 Gram(s) Oral once  dextrose 5%. 1000 milliLiter(s) (50 mL/Hr) IV Continuous <Continuous>  dextrose 5%. 1000 milliLiter(s) (100 mL/Hr) IV Continuous <Continuous>  dextrose 50% Injectable 25 Gram(s) IV Push once  dextrose 50% Injectable 12.5 Gram(s) IV Push once  dextrose 50% Injectable 25 Gram(s) IV Push once  enoxaparin Injectable 80 milliGRAM(s) SubCutaneous two times a day  glucagon  Injectable 1 milliGRAM(s) IntraMuscular once  hydrochlorothiazide 25 milliGRAM(s) Oral daily  insulin lispro (ADMELOG) corrective regimen sliding scale   SubCutaneous three times a day before meals  insulin lispro (ADMELOG) corrective regimen sliding scale   SubCutaneous at bedtime  losartan 100 milliGRAM(s) Oral daily  metoprolol tartrate 50 milliGRAM(s) Oral two times a day  remdesivir  IVPB 100 milliGRAM(s) IV Intermittent every 24 hours  remdesivir  IVPB   IV Intermittent   simvastatin 20 milliGRAM(s) Oral at bedtime    MEDICATIONS  (PRN):  aluminum hydroxide/magnesium hydroxide/simethicone Suspension 30 milliLiter(s) Oral every 4 hours PRN Dyspepsia  guaiFENesin Oral Liquid (Sugar-Free) 200 milliGRAM(s) Oral every 6 hours PRN Cough         Vitals log        ICU Vital Signs Last 24 Hrs  T(C): 36.8 (19 Jan 2022 05:01), Max: 36.9 (18 Jan 2022 11:32)  T(F): 98.2 (19 Jan 2022 05:01), Max: 98.4 (18 Jan 2022 11:32)  HR: 88 (19 Jan 2022 05:01) (75 - 88)  BP: 158/76 (19 Jan 2022 05:01) (148/72 - 160/65)  BP(mean): --  ABP: --  ABP(mean): --  RR: 18 (19 Jan 2022 05:01) (18 - 18)  SpO2: 92% (19 Jan 2022 05:01) (91% - 94%)           Input and Output:  I&O's Detail      Lab Data                        12.8   4.65  )-----------( 175      ( 18 Jan 2022 08:16 )             38.2     01-18    139  |  108  |  16  ----------------------------<  118<H>  4.0   |  25  |  0.78    Ca    8.9      18 Jan 2022 08:16    TPro  6.8  /  Alb  2.5<L>  /  TBili  0.4  /  DBili  x   /  AST  87<H>  /  ALT  48  /  AlkPhos  50  01-18            Review of Systems	      Objective     Physical Examination    heart s1s2  lung dec BS  abd soft      Pertinent Lab findings & Imaging      Frank:  NO   Adequate UO     I&O's Detail           Discussed with:     Cultures:	        Radiology

## 2022-01-19 NOTE — PROGRESS NOTE ADULT - ASSESSMENT
60 yo obese F with PMH of Type 2 DM on insulin, HTN, HLD who presents with 1 week of dry cough, intermittent chest pain induced by coughing, myalgias admitted with acute respiratory failure with hypoxia sec to COVID PNA.     Problem/Plan - 1:  ·  Problem: Acute respiratory failure with hypoxia.   ·  Plan: monitor resp status  apprec pulm recs  maintain sats above 88% with O2 supplemental support.   taper down O2 as able, currently on 5-6L NC  CTA did not show PE but consistent with COVID PNA     Problem/Plan - 2:  ·  Problem: Pneumonia due to COVID-19 virus.   ·  Plan: pt unvaccinated   cont decadron 6mg daily for 10 days  apprec ID recs Dr Paredes  apprec pulm recs Dr Whiteside  c/w remdesmivir (day 3/5)  taper down O2 as able, currently on 5-6L NC  VTE ppx with full dose lovenox due to high-risk, high d-dimer     Problem/Plan - 3:  ·  Problem: Type 2 diabetes mellitus.   ·  Plan: chronic, stable  c/w mdiss with hypoglycemic protocol  diabetic diet.     Problem/Plan - 4:  ·  Problem: HTN (hypertension).   cont home meds with hold parameters  Amlodipine 10mg po daily   HCTZ 25mg po daily   Metoprolol 50mg po bid      Problem/Plan - 5:  ·  Problem: HLD (hyperlipidemia).   ·  Plan: chronic  Aspirin 81mg daily   Simvastatin 20mg po daily      Problem/Plan - 6:  ·  Problem: Need for prophylactic measure.   ·  Plan: full dose lovenox due to high-risk, high d-dimer  Maalox for dyspeptic symptoms.   will give gi ppx with protonix

## 2022-01-19 NOTE — PROGRESS NOTE ADULT - ASSESSMENT
60 yo obese F with pmh Dm2 on insulin. HTN, HLD who presents with 1 week of dry cough, intermittent chest pain induced by coughing, myalgias admitted with acute respiratory failure with hypoxia sec to COVID PNA      ID eval noted  vs noted  labs reviewed  remains on o2 support    serial D dimer  isolation for Covid  robitussin - tylenol for sx management  remdesivir - decadron - covid with hypoxemia  ct chest - viral pna  fio2 titration - keep sat > 88 pct  dvt p  cvs rx regimen for HLD - HTN -   DM care - serial FS -   education  counseling  emotional support

## 2022-01-19 NOTE — PROGRESS NOTE ADULT - ASSESSMENT
60 yo female with pmh Dm2 on insulin. HTN, HLD, BMI 29, who presented with 1 week of dry cough, intermittent chest pain induced by coughing, myalgias. Found to have COVID pneumonia, requiring O2 via nasal cannula. She remains afebrile and without leukocytosis. GI symptoms could be COVID related but would monitor.    -continue remdesivir  -continue dexamethasone to complete 10 days  -AC per institution protocol  -if persistent abdominal discomfort consider further imaging  -follow cultures to completion  -will sign off, please call ID if any further questions or changes in respiratory status. Thank you.    Dulce Boyd MD  Division of infectious Diseases  Cell [phone] between 8am and 6pm  After 6pm and over the weekends please call ID service line at [phone].    60 yo female with pmh Dm2 on insulin. HTN, HLD, BMI 29, who presented with 1 week of dry cough, intermittent chest pain induced by coughing, myalgias. Found to have COVID pneumonia, requiring O2 via nasal cannula. She remains afebrile and without leukocytosis. GI symptoms could be COVID related but would monitor.    -continue remdesivir (day 3/5) to complete 5 days  -continue dexamethasone to complete 10 days  -AC per institution protocol  -if persistent abdominal discomfort consider further imaging  -follow cultures to completion  -will sign off, please call ID if any further questions or changes in respiratory status. Thank you.    Dulce Boyd MD  Division of infectious Diseases  Cell [phone] between 8am and 6pm  After 6pm and over the weekends please call ID service line at [phone].

## 2022-01-19 NOTE — PROGRESS NOTE ADULT - SUBJECTIVE AND OBJECTIVE BOX
[facility]  INFECTIOUS DISEASES - [address], [place] [address]  Tel: [phone]     Fax: [phone]  =======================================================    JIYM BELLO [id number]    Follow up: No fevers. seen earlier today on 6L nasal cannula. Translation by [facility]  #[id number]. Patient says she "doesn't feel too good" as she is still hooked to oxygen. still with cough. Has had intermittent abdominal pain for the past 10 days. Denies any nausea, diarrhea, dysuria.    Allergies:  No Known Allergies      Antibiotics:  aluminum hydroxide/magnesium hydroxide/simethicone Suspension 30 milliLiter(s) Oral every 4 hours PRN  amLODIPine   Tablet 10 milliGRAM(s) Oral daily  aspirin enteric coated 81 milliGRAM(s) Oral daily  benzonatate 100 milliGRAM(s) Oral three times a day  dexAMETHasone     Tablet 6 milliGRAM(s) Oral daily  dextrose 40% Gel 15 Gram(s) Oral once  dextrose 5%. 1000 milliLiter(s) IV Continuous <Continuous>  dextrose 5%. 1000 milliLiter(s) IV Continuous <Continuous>  dextrose 50% Injectable 25 Gram(s) IV Push once  dextrose 50% Injectable 12.5 Gram(s) IV Push once  dextrose 50% Injectable 25 Gram(s) IV Push once  enoxaparin Injectable 80 milliGRAM(s) SubCutaneous two times a day  glucagon  Injectable 1 milliGRAM(s) IntraMuscular once  guaiFENesin Oral Liquid (Sugar-Free) 200 milliGRAM(s) Oral every 6 hours PRN  hydrochlorothiazide 25 milliGRAM(s) Oral daily  insulin lispro (ADMELOG) corrective regimen sliding scale   SubCutaneous three times a day before meals  insulin lispro (ADMELOG) corrective regimen sliding scale   SubCutaneous at bedtime  losartan 100 milliGRAM(s) Oral daily  metoprolol tartrate 50 milliGRAM(s) Oral two times a day  remdesivir  IVPB 100 milliGRAM(s) IV Intermittent every 24 hours  remdesivir  IVPB   IV Intermittent   simvastatin 20 milliGRAM(s) Oral at bedtime       REVIEW OF SYSTEMS:  CONSTITUTIONAL:  No Fever or chills  CARDIOVASCULAR:  No chest pain   RESPIRATORY: see history  GASTROINTESTINAL:  see history  GENITOURINARY:  No dysuria  NEUROLOGIC:  No headache, no dizziness  PSYCHIATRIC:  No disorder of thought or mood.     Physical Exam:  ICU Vital Signs Last 24 Hrs  T(C): 36.7 (19 Jan 2022 21:01), Max: 36.8 (19 Jan 2022 05:01)  T(F): 98 (19 Jan 2022 21:01), Max: 98.2 (19 Jan 2022 05:01)  HR: 75 (19 Jan 2022 21:01) (75 - 88)  BP: 162/68 (19 Jan 2022 21:01) (148/78 - 162/68)  BP(mean): --  ABP: --  ABP(mean): --  RR: 18 (19 Jan 2022 21:01) (18 - 18)  SpO2: 91% (19 Jan 2022 21:01) (85% - 92%)     GEN: NAD  HEENT: normocephalic and atraumatic.   NECK: Supple.    LUNGS: normal respiratory effort  HEART: Regular rate and rhythm  ABDOMEN: Soft, nontender, and nondistended.   EXTREMITIES: no leg edema.  NEUROLOGIC: answering questions appropriately  PSYCHIATRIC: Appropriate affect .    Labs:  01-19    135  |  103  |  20  ----------------------------<  202<H>  5.2   |  27  |  0.97    Ca    9.5      19 Jan 2022 11:42    TPro  7.0  /  Alb  2.7<L>  /  TBili  0.4  /  DBili  <0.1  /  AST  84<H>  /  ALT  52  /  AlkPhos  61  01-19                          12.8   4.65  )-----------( 175      ( 18 Jan 2022 08:16 )             38.2     PT/INR - ( 19 Jan 2022 09:01 )   PT: 12.4 sec;   INR: 1.06 ratio             LIVER FUNCTIONS - ( 19 Jan 2022 09:01 )  Alb: 2.7 g/dL / Pro: 7.0 g/dL / ALK PHOS: 61 U/L / ALT: 52 U/L / AST: 84 U/L / GGT: x             RECENT CULTURES:  01-18 @ 00:43 Clean Catch Clean Catch (Midstream)     <10,000 CFU/mL Normal Urogenital Mariel        01-17 @ 18:06 .Blood Blood-Peripheral     No growth to date.        01-17 @ 17:59 .Blood Blood-Peripheral     No growth to date.        01-17 @ 14:09          Detected        All imaging and data are reviewed.

## 2022-01-19 NOTE — PROGRESS NOTE ADULT - TIME BILLING
Note written by attending, see above.  Time spent: 40min. More than 50% of the visit was spent counseling the patient on medical condition - acute hypoxic resp failure due to COVID PNA.

## 2022-01-19 NOTE — PROGRESS NOTE ADULT - SUBJECTIVE AND OBJECTIVE BOX
Patient is a 61y old  Female who presents with a chief complaint of cough, SOB.      INTERVAL HPI/OVERNIGHT EVENTS: Pt admits VEE. Denies SOB at rest on NC. Denies CP, fever, chills, abd pain.    MEDICATIONS  (STANDING):  amLODIPine   Tablet 10 milliGRAM(s) Oral daily  aspirin enteric coated 81 milliGRAM(s) Oral daily  benzonatate 100 milliGRAM(s) Oral three times a day  dexAMETHasone     Tablet 6 milliGRAM(s) Oral daily  dextrose 40% Gel 15 Gram(s) Oral once  dextrose 5%. 1000 milliLiter(s) (50 mL/Hr) IV Continuous <Continuous>  dextrose 5%. 1000 milliLiter(s) (100 mL/Hr) IV Continuous <Continuous>  dextrose 50% Injectable 25 Gram(s) IV Push once  dextrose 50% Injectable 12.5 Gram(s) IV Push once  dextrose 50% Injectable 25 Gram(s) IV Push once  enoxaparin Injectable 80 milliGRAM(s) SubCutaneous two times a day  glucagon  Injectable 1 milliGRAM(s) IntraMuscular once  hydrochlorothiazide 25 milliGRAM(s) Oral daily  insulin lispro (ADMELOG) corrective regimen sliding scale   SubCutaneous three times a day before meals  insulin lispro (ADMELOG) corrective regimen sliding scale   SubCutaneous at bedtime  losartan 100 milliGRAM(s) Oral daily  metoprolol tartrate 50 milliGRAM(s) Oral two times a day  remdesivir  IVPB 100 milliGRAM(s) IV Intermittent every 24 hours  remdesivir  IVPB   IV Intermittent   simvastatin 20 milliGRAM(s) Oral at bedtime    MEDICATIONS  (PRN):  aluminum hydroxide/magnesium hydroxide/simethicone Suspension 30 milliLiter(s) Oral every 4 hours PRN Dyspepsia  guaiFENesin Oral Liquid (Sugar-Free) 200 milliGRAM(s) Oral every 6 hours PRN Cough      Allergies    No Known Allergies    Intolerances        REVIEW OF SYSTEMS:  CONSTITUTIONAL: No fever or chills  HEENT:  No headache, no sore throat  RESPIRATORY: No cough, wheezing, or shortness of breath  CARDIOVASCULAR: No chest pain, palpitations  GASTROINTESTINAL: No abd pain, nausea, vomiting, or diarrhea  GENITOURINARY: No dysuria, frequency, or hematuria  NEUROLOGICAL: no focal weakness or dizziness  MUSCULOSKELETAL: no myalgias     Vital Signs Last 24 Hrs  T(C): 36.7 (19 Jan 2022 21:01), Max: 36.8 (19 Jan 2022 05:01)  T(F): 98 (19 Jan 2022 21:01), Max: 98.2 (19 Jan 2022 05:01)  HR: 75 (19 Jan 2022 21:01) (75 - 88)  BP: 162/68 (19 Jan 2022 21:01) (148/78 - 162/68)  BP(mean): --  RR: 18 (19 Jan 2022 21:01) (18 - 18)  SpO2: 91% (19 Jan 2022 21:01) (85% - 92%)    PHYSICAL EXAM:  GENERAL: NAD  HEENT:  anicteric, moist mucous membranes  CHEST/LUNG:  coarse breath sounds b/l  HEART:  RRR, S1, S2  ABDOMEN:  BS+, soft, nontender, nondistended  EXTREMITIES: no edema, cyanosis, or calf tenderness  NERVOUS SYSTEM: answers questions and follows commands appropriately    LABS:    CBC Full  -  ( 18 Jan 2022 08:16 )  WBC Count : 4.65 K/uL  Hemoglobin : 12.8 g/dL  Hematocrit : 38.2 %  Platelet Count - Automated : 175 K/uL  Mean Cell Volume : 89.9 fl  Mean Cell Hemoglobin : 30.1 pg  Mean Cell Hemoglobin Concentration : 33.5 gm/dL  Auto Neutrophil # : 3.95 K/uL  Auto Lymphocyte # : 0.28 K/uL  Auto Monocyte # : 0.33 K/uL  Auto Eosinophil # : 0.00 K/uL  Auto Basophil # : 0.00 K/uL  Auto Neutrophil % : 72.0 %  Auto Lymphocyte % : 6.0 %  Auto Monocyte % : 7.0 %  Auto Eosinophil % : 0.0 %  Auto Basophil % : 0.0 %    19 Jan 2022 11:42    135    |  103    |  20     ----------------------------<  202    5.2     |  27     |  0.97     Ca    9.5        19 Jan 2022 11:42    TPro  7.0    /  Alb  2.7    /  TBili  0.4    /  DBili  <0.1   /  AST  84     /  ALT  52     /  AlkPhos  61     19 Jan 2022 09:01    PT/INR - ( 19 Jan 2022 09:01 )   PT: 12.4 sec;   INR: 1.06 ratio             CAPILLARY BLOOD GLUCOSE      POCT Blood Glucose.: 220 mg/dL (19 Jan 2022 16:48)  POCT Blood Glucose.: 188 mg/dL (19 Jan 2022 11:42)  POCT Blood Glucose.: 193 mg/dL (19 Jan 2022 08:02)        Culture - Urine (collected 01-18-22 @ 00:43)  Source: Clean Catch Clean Catch (Midstream)  Final Report (01-18-22 @ 19:50):    <10,000 CFU/mL Normal Urogenital Mariel    Culture - Blood (collected 01-17-22 @ 18:06)  Source: .Blood Blood-Peripheral  Preliminary Report (01-18-22 @ 19:02):    No growth to date.    Culture - Blood (collected 01-17-22 @ 17:59)  Source: .Blood Blood-Peripheral  Preliminary Report (01-18-22 @ 18:01):    No growth to date.        RADIOLOGY & ADDITIONAL TESTS:    Personally reviewed.     Consultant(s) Notes Reviewed:  [x] YES  [ ] NO     Patient is a 61y old  Female who presents with a chief complaint of cough, SOB.      INTERVAL HPI/OVERNIGHT EVENTS: Pt admits VEE. Denies SOB at rest on NC. Denies CP, fever, chills, abd pain.    MEDICATIONS  (STANDING):  amLODIPine   Tablet 10 milliGRAM(s) Oral daily  aspirin enteric coated 81 milliGRAM(s) Oral daily  benzonatate 100 milliGRAM(s) Oral three times a day  dexAMETHasone     Tablet 6 milliGRAM(s) Oral daily  dextrose 40% Gel 15 Gram(s) Oral once  dextrose 5%. 1000 milliLiter(s) (50 mL/Hr) IV Continuous <Continuous>  dextrose 5%. 1000 milliLiter(s) (100 mL/Hr) IV Continuous <Continuous>  dextrose 50% Injectable 25 Gram(s) IV Push once  dextrose 50% Injectable 12.5 Gram(s) IV Push once  dextrose 50% Injectable 25 Gram(s) IV Push once  enoxaparin Injectable 80 milliGRAM(s) SubCutaneous two times a day  glucagon  Injectable 1 milliGRAM(s) IntraMuscular once  hydrochlorothiazide 25 milliGRAM(s) Oral daily  insulin lispro (ADMELOG) corrective regimen sliding scale   SubCutaneous three times a day before meals  insulin lispro (ADMELOG) corrective regimen sliding scale   SubCutaneous at bedtime  losartan 100 milliGRAM(s) Oral daily  metoprolol tartrate 50 milliGRAM(s) Oral two times a day  remdesivir  IVPB 100 milliGRAM(s) IV Intermittent every 24 hours  remdesivir  IVPB   IV Intermittent   simvastatin 20 milliGRAM(s) Oral at bedtime    MEDICATIONS  (PRN):  aluminum hydroxide/magnesium hydroxide/simethicone Suspension 30 milliLiter(s) Oral every 4 hours PRN Dyspepsia  guaiFENesin Oral Liquid (Sugar-Free) 200 milliGRAM(s) Oral every 6 hours PRN Cough      Allergies    No Known Allergies    Intolerances        REVIEW OF SYSTEMS:  CONSTITUTIONAL: No fever or chills  HEENT:  No headache, no sore throat  RESPIRATORY: +cough, no shortness of breath at rest on NC; +VEE  CARDIOVASCULAR: No chest pain, palpitations  GASTROINTESTINAL: No abd pain, nausea, vomiting, or diarrhea  GENITOURINARY: No dysuria, frequency, or hematuria  NEUROLOGICAL: no focal weakness or dizziness  MUSCULOSKELETAL: no myalgias     Vital Signs Last 24 Hrs  T(C): 36.7 (19 Jan 2022 21:01), Max: 36.8 (19 Jan 2022 05:01)  T(F): 98 (19 Jan 2022 21:01), Max: 98.2 (19 Jan 2022 05:01)  HR: 75 (19 Jan 2022 21:01) (75 - 88)  BP: 162/68 (19 Jan 2022 21:01) (148/78 - 162/68)  BP(mean): --  RR: 18 (19 Jan 2022 21:01) (18 - 18)  SpO2: 91% (19 Jan 2022 21:01) (85% - 92%)    PHYSICAL EXAM:  GENERAL: NAD  HEENT:  anicteric, moist mucous membranes  CHEST/LUNG:  coarse breath sounds b/l  HEART:  RRR, S1, S2  ABDOMEN:  BS+, soft, nontender, nondistended  EXTREMITIES: no cyanosis, or calf tenderness  NERVOUS SYSTEM: answers questions and follows commands appropriately    LABS:    CBC Full  -  ( 18 Jan 2022 08:16 )  WBC Count : 4.65 K/uL  Hemoglobin : 12.8 g/dL  Hematocrit : 38.2 %  Platelet Count - Automated : 175 K/uL  Mean Cell Volume : 89.9 fl  Mean Cell Hemoglobin : 30.1 pg  Mean Cell Hemoglobin Concentration : 33.5 gm/dL  Auto Neutrophil # : 3.95 K/uL  Auto Lymphocyte # : 0.28 K/uL  Auto Monocyte # : 0.33 K/uL  Auto Eosinophil # : 0.00 K/uL  Auto Basophil # : 0.00 K/uL  Auto Neutrophil % : 72.0 %  Auto Lymphocyte % : 6.0 %  Auto Monocyte % : 7.0 %  Auto Eosinophil % : 0.0 %  Auto Basophil % : 0.0 %    19 Jan 2022 11:42    135    |  103    |  20     ----------------------------<  202    5.2     |  27     |  0.97     Ca    9.5        19 Jan 2022 11:42    TPro  7.0    /  Alb  2.7    /  TBili  0.4    /  DBili  <0.1   /  AST  84     /  ALT  52     /  AlkPhos  61     19 Jan 2022 09:01    PT/INR - ( 19 Jan 2022 09:01 )   PT: 12.4 sec;   INR: 1.06 ratio             CAPILLARY BLOOD GLUCOSE      POCT Blood Glucose.: 220 mg/dL (19 Jan 2022 16:48)  POCT Blood Glucose.: 188 mg/dL (19 Jan 2022 11:42)  POCT Blood Glucose.: 193 mg/dL (19 Jan 2022 08:02)        Culture - Urine (collected 01-18-22 @ 00:43)  Source: Clean Catch Clean Catch (Midstream)  Final Report (01-18-22 @ 19:50):    <10,000 CFU/mL Normal Urogenital Mariel    Culture - Blood (collected 01-17-22 @ 18:06)  Source: .Blood Blood-Peripheral  Preliminary Report (01-18-22 @ 19:02):    No growth to date.    Culture - Blood (collected 01-17-22 @ 17:59)  Source: .Blood Blood-Peripheral  Preliminary Report (01-18-22 @ 18:01):    No growth to date.        RADIOLOGY & ADDITIONAL TESTS:    Personally reviewed.     Consultant(s) Notes Reviewed:  [x] YES  [ ] NO

## 2022-01-20 LAB
ALBUMIN SERPL ELPH-MCNC: 2.7 G/DL — LOW (ref 3.3–5)
ALP SERPL-CCNC: 62 U/L — SIGNIFICANT CHANGE UP (ref 40–120)
ALT FLD-CCNC: 60 U/L — SIGNIFICANT CHANGE UP (ref 12–78)
ANION GAP SERPL CALC-SCNC: 8 MMOL/L — SIGNIFICANT CHANGE UP (ref 5–17)
AST SERPL-CCNC: 84 U/L — HIGH (ref 15–37)
BASOPHILS # BLD AUTO: 0 K/UL — SIGNIFICANT CHANGE UP (ref 0–0.2)
BASOPHILS NFR BLD AUTO: 0 % — SIGNIFICANT CHANGE UP (ref 0–2)
BILIRUB SERPL-MCNC: 0.5 MG/DL — SIGNIFICANT CHANGE UP (ref 0.2–1.2)
BUN SERPL-MCNC: 20 MG/DL — SIGNIFICANT CHANGE UP (ref 7–23)
CALCIUM SERPL-MCNC: 8.9 MG/DL — SIGNIFICANT CHANGE UP (ref 8.5–10.1)
CHLORIDE SERPL-SCNC: 104 MMOL/L — SIGNIFICANT CHANGE UP (ref 96–108)
CO2 SERPL-SCNC: 25 MMOL/L — SIGNIFICANT CHANGE UP (ref 22–31)
CREAT SERPL-MCNC: 0.78 MG/DL — SIGNIFICANT CHANGE UP (ref 0.5–1.3)
CRP SERPL-MCNC: 6 MG/L — HIGH
D DIMER BLD IA.RAPID-MCNC: 314 NG/ML DDU — HIGH
EOSINOPHIL # BLD AUTO: 0 K/UL — SIGNIFICANT CHANGE UP (ref 0–0.5)
EOSINOPHIL NFR BLD AUTO: 0 % — SIGNIFICANT CHANGE UP (ref 0–6)
FERRITIN SERPL-MCNC: 1206 NG/ML — HIGH (ref 15–150)
GLUCOSE SERPL-MCNC: 97 MG/DL — SIGNIFICANT CHANGE UP (ref 70–99)
HCT VFR BLD CALC: 38.7 % — SIGNIFICANT CHANGE UP (ref 34.5–45)
HGB BLD-MCNC: 13.4 G/DL — SIGNIFICANT CHANGE UP (ref 11.5–15.5)
IMM GRANULOCYTES NFR BLD AUTO: 0.5 % — SIGNIFICANT CHANGE UP (ref 0–1.5)
LYMPHOCYTES # BLD AUTO: 0.82 K/UL — LOW (ref 1–3.3)
LYMPHOCYTES # BLD AUTO: 10.5 % — LOW (ref 13–44)
MAGNESIUM SERPL-MCNC: 2.3 MG/DL — SIGNIFICANT CHANGE UP (ref 1.6–2.6)
MCHC RBC-ENTMCNC: 30.2 PG — SIGNIFICANT CHANGE UP (ref 27–34)
MCHC RBC-ENTMCNC: 34.6 GM/DL — SIGNIFICANT CHANGE UP (ref 32–36)
MCV RBC AUTO: 87.2 FL — SIGNIFICANT CHANGE UP (ref 80–100)
MONOCYTES # BLD AUTO: 0.4 K/UL — SIGNIFICANT CHANGE UP (ref 0–0.9)
MONOCYTES NFR BLD AUTO: 5.1 % — SIGNIFICANT CHANGE UP (ref 2–14)
NEUTROPHILS # BLD AUTO: 6.56 K/UL — SIGNIFICANT CHANGE UP (ref 1.8–7.4)
NEUTROPHILS NFR BLD AUTO: 83.9 % — HIGH (ref 43–77)
NRBC # BLD: 0 /100 WBCS — SIGNIFICANT CHANGE UP (ref 0–0)
PHOSPHATE SERPL-MCNC: 2.7 MG/DL — SIGNIFICANT CHANGE UP (ref 2.5–4.5)
PLATELET # BLD AUTO: 271 K/UL — SIGNIFICANT CHANGE UP (ref 150–400)
POTASSIUM SERPL-MCNC: 3.8 MMOL/L — SIGNIFICANT CHANGE UP (ref 3.5–5.3)
POTASSIUM SERPL-SCNC: 3.8 MMOL/L — SIGNIFICANT CHANGE UP (ref 3.5–5.3)
PROT SERPL-MCNC: 6.7 G/DL — SIGNIFICANT CHANGE UP (ref 6–8.3)
RBC # BLD: 4.44 M/UL — SIGNIFICANT CHANGE UP (ref 3.8–5.2)
RBC # FLD: 12.5 % — SIGNIFICANT CHANGE UP (ref 10.3–14.5)
SODIUM SERPL-SCNC: 137 MMOL/L — SIGNIFICANT CHANGE UP (ref 135–145)
WBC # BLD: 7.82 K/UL — SIGNIFICANT CHANGE UP (ref 3.8–10.5)
WBC # FLD AUTO: 7.82 K/UL — SIGNIFICANT CHANGE UP (ref 3.8–10.5)

## 2022-01-20 PROCEDURE — 99233 SBSQ HOSP IP/OBS HIGH 50: CPT

## 2022-01-20 RX ORDER — PANTOPRAZOLE SODIUM 20 MG/1
40 TABLET, DELAYED RELEASE ORAL
Refills: 0 | Status: DISCONTINUED | OUTPATIENT
Start: 2022-01-20 | End: 2022-01-27

## 2022-01-20 RX ADMIN — Medication 100 MILLIGRAM(S): at 05:07

## 2022-01-20 RX ADMIN — Medication 200 MILLIGRAM(S): at 21:26

## 2022-01-20 RX ADMIN — PANTOPRAZOLE SODIUM 40 MILLIGRAM(S): 20 TABLET, DELAYED RELEASE ORAL at 07:56

## 2022-01-20 RX ADMIN — Medication 2: at 11:49

## 2022-01-20 RX ADMIN — Medication 100 MILLIGRAM(S): at 13:18

## 2022-01-20 RX ADMIN — Medication 50 MILLIGRAM(S): at 05:07

## 2022-01-20 RX ADMIN — Medication 81 MILLIGRAM(S): at 11:29

## 2022-01-20 RX ADMIN — Medication 100 MILLIGRAM(S): at 21:26

## 2022-01-20 RX ADMIN — SIMVASTATIN 20 MILLIGRAM(S): 20 TABLET, FILM COATED ORAL at 21:26

## 2022-01-20 RX ADMIN — Medication 6 MILLIGRAM(S): at 05:07

## 2022-01-20 RX ADMIN — Medication 50 MILLIGRAM(S): at 17:17

## 2022-01-20 RX ADMIN — LOSARTAN POTASSIUM 100 MILLIGRAM(S): 100 TABLET, FILM COATED ORAL at 05:07

## 2022-01-20 RX ADMIN — ENOXAPARIN SODIUM 80 MILLIGRAM(S): 100 INJECTION SUBCUTANEOUS at 17:17

## 2022-01-20 RX ADMIN — Medication 25 MILLIGRAM(S): at 05:07

## 2022-01-20 RX ADMIN — Medication 2: at 17:17

## 2022-01-20 RX ADMIN — ENOXAPARIN SODIUM 80 MILLIGRAM(S): 100 INJECTION SUBCUTANEOUS at 05:07

## 2022-01-20 RX ADMIN — REMDESIVIR 500 MILLIGRAM(S): 5 INJECTION INTRAVENOUS at 21:26

## 2022-01-20 RX ADMIN — AMLODIPINE BESYLATE 10 MILLIGRAM(S): 2.5 TABLET ORAL at 05:07

## 2022-01-20 NOTE — PROGRESS NOTE ADULT - TIME BILLING
Note written by attending, see above.  Time spent: 40min. More than 50% of the visit was spent counseling the patient on medical condition - acute hypoxic resp failure due to COVID PNA, venturi mask vs NC, Remdesivir, decadron.

## 2022-01-20 NOTE — PROGRESS NOTE ADULT - SUBJECTIVE AND OBJECTIVE BOX
Patient is a 61y old  Female who presents with a chief complaint of cough, SOB.      INTERVAL HPI/OVERNIGHT EVENTS: Overnight, pt was desaturating to the 80s on 6L NC so pt was placed on 50% venturi mask. Pt admits VEE. Denies SOB at rest on venturi mask. Pt reports abdominal muscle soreness when she coughs. Denies abd pain when she is not coughing. Denies nausea, vomiting, diarrhea. Denies CP, fever, chills.    MEDICATIONS  (STANDING):  amLODIPine   Tablet 10 milliGRAM(s) Oral daily  aspirin enteric coated 81 milliGRAM(s) Oral daily  benzonatate 100 milliGRAM(s) Oral three times a day  dexAMETHasone     Tablet 6 milliGRAM(s) Oral daily  dextrose 40% Gel 15 Gram(s) Oral once  dextrose 5%. 1000 milliLiter(s) (50 mL/Hr) IV Continuous <Continuous>  dextrose 5%. 1000 milliLiter(s) (100 mL/Hr) IV Continuous <Continuous>  dextrose 50% Injectable 25 Gram(s) IV Push once  dextrose 50% Injectable 25 Gram(s) IV Push once  dextrose 50% Injectable 12.5 Gram(s) IV Push once  enoxaparin Injectable 80 milliGRAM(s) SubCutaneous two times a day  glucagon  Injectable 1 milliGRAM(s) IntraMuscular once  hydrochlorothiazide 25 milliGRAM(s) Oral daily  insulin lispro (ADMELOG) corrective regimen sliding scale   SubCutaneous three times a day before meals  insulin lispro (ADMELOG) corrective regimen sliding scale   SubCutaneous at bedtime  losartan 100 milliGRAM(s) Oral daily  metoprolol tartrate 50 milliGRAM(s) Oral two times a day  pantoprazole    Tablet 40 milliGRAM(s) Oral before breakfast  remdesivir  IVPB 100 milliGRAM(s) IV Intermittent every 24 hours  remdesivir  IVPB   IV Intermittent   simvastatin 20 milliGRAM(s) Oral at bedtime    MEDICATIONS  (PRN):  aluminum hydroxide/magnesium hydroxide/simethicone Suspension 30 milliLiter(s) Oral every 4 hours PRN Dyspepsia  guaiFENesin Oral Liquid (Sugar-Free) 200 milliGRAM(s) Oral every 6 hours PRN Cough        Allergies    No Known Allergies    Intolerances        REVIEW OF SYSTEMS:  CONSTITUTIONAL: No fever or chills  HEENT:  No headache, no sore throat  RESPIRATORY: +cough, no shortness of breath at rest on NC; +VEE  CARDIOVASCULAR: No chest pain, palpitations  GASTROINTESTINAL: +abd muscle soreness when coughing; No abd pain, nausea, vomiting, or diarrhea  GENITOURINARY: No dysuria, frequency, or hematuria  NEUROLOGICAL: no focal weakness or dizziness  MUSCULOSKELETAL: no myalgias     Vital Signs Last 24 Hrs  T(C): 36.6 (20 Jan 2022 04:44), Max: 36.7 (19 Jan 2022 21:01)  T(F): 97.8 (20 Jan 2022 04:44), Max: 98 (19 Jan 2022 21:01)  HR: 84 (20 Jan 2022 04:44) (75 - 84)  BP: 154/76 (20 Jan 2022 04:44) (148/78 - 162/68)  BP(mean): --  RR: 18 (20 Jan 2022 04:44) (18 - 18)  SpO2: 92% (20 Jan 2022 04:44) (85% - 92%)    PHYSICAL EXAM:  GENERAL: NAD  HEENT:  anicteric, moist mucous membranes  CHEST/LUNG:  coarse breath sounds b/l  HEART:  RRR, S1, S2  ABDOMEN:  BS+, soft, nontender, nondistended  EXTREMITIES: no cyanosis, or calf tenderness  NERVOUS SYSTEM: answers questions and follows commands appropriately    LABS:                          13.4   7.82  )-----------( 271      ( 20 Jan 2022 07:37 )             38.7     CBC Full  -  ( 20 Jan 2022 07:37 )  WBC Count : 7.82 K/uL  Hemoglobin : 13.4 g/dL  Hematocrit : 38.7 %  Platelet Count - Automated : 271 K/uL  Mean Cell Volume : 87.2 fl  Mean Cell Hemoglobin : 30.2 pg  Mean Cell Hemoglobin Concentration : 34.6 gm/dL  Auto Neutrophil # : 6.56 K/uL  Auto Lymphocyte # : 0.82 K/uL  Auto Monocyte # : 0.40 K/uL  Auto Eosinophil # : 0.00 K/uL  Auto Basophil # : 0.00 K/uL  Auto Neutrophil % : 83.9 %  Auto Lymphocyte % : 10.5 %  Auto Monocyte % : 5.1 %  Auto Eosinophil % : 0.0 %  Auto Basophil % : 0.0 %    01-20    137  |  104  |  20  ----------------------------<  97  3.8   |  25  |  0.78    Ca    8.9      20 Jan 2022 07:37  Phos  2.7     01-20  Mg     2.3     01-20    TPro  6.7  /  Alb  2.7<L>  /  TBili  0.5  /  DBili  x   /  AST  84<H>  /  ALT  60  /  AlkPhos  62  01-20            Culture - Urine (collected 01-18-22 @ 00:43)  Source: Clean Catch Clean Catch (Midstream)  Final Report (01-18-22 @ 19:50):    <10,000 CFU/mL Normal Urogenital Mariel    Culture - Blood (collected 01-17-22 @ 18:06)  Source: .Blood Blood-Peripheral  Preliminary Report (01-18-22 @ 19:02):    No growth to date.    Culture - Blood (collected 01-17-22 @ 17:59)  Source: .Blood Blood-Peripheral  Preliminary Report (01-18-22 @ 18:01):    No growth to date.        RADIOLOGY & ADDITIONAL TESTS:    Personally reviewed.     Consultant(s) Notes Reviewed:  [x] YES  [ ] NO     Patient is a 61y old  Female who presents with a chief complaint of cough, SOB.       INTERVAL HPI/OVERNIGHT EVENTS: Overnight, pt was desaturating to the 80s on 6L NC so pt was placed on 50% venturi mask. Pt admits VEE. Denies SOB at rest on venturi mask. Pt reports abdominal muscle soreness when she coughs. Denies abd pain when she is not coughing. Denies nausea, vomiting, diarrhea. Denies CP, fever, chills.    MEDICATIONS  (STANDING):  amLODIPine   Tablet 10 milliGRAM(s) Oral daily  aspirin enteric coated 81 milliGRAM(s) Oral daily  benzonatate 100 milliGRAM(s) Oral three times a day  dexAMETHasone     Tablet 6 milliGRAM(s) Oral daily  dextrose 40% Gel 15 Gram(s) Oral once  dextrose 5%. 1000 milliLiter(s) (50 mL/Hr) IV Continuous <Continuous>  dextrose 5%. 1000 milliLiter(s) (100 mL/Hr) IV Continuous <Continuous>  dextrose 50% Injectable 25 Gram(s) IV Push once  dextrose 50% Injectable 25 Gram(s) IV Push once  dextrose 50% Injectable 12.5 Gram(s) IV Push once  enoxaparin Injectable 80 milliGRAM(s) SubCutaneous two times a day  glucagon  Injectable 1 milliGRAM(s) IntraMuscular once  hydrochlorothiazide 25 milliGRAM(s) Oral daily  insulin lispro (ADMELOG) corrective regimen sliding scale   SubCutaneous three times a day before meals  insulin lispro (ADMELOG) corrective regimen sliding scale   SubCutaneous at bedtime  losartan 100 milliGRAM(s) Oral daily  metoprolol tartrate 50 milliGRAM(s) Oral two times a day  pantoprazole    Tablet 40 milliGRAM(s) Oral before breakfast  remdesivir  IVPB 100 milliGRAM(s) IV Intermittent every 24 hours  remdesivir  IVPB   IV Intermittent   simvastatin 20 milliGRAM(s) Oral at bedtime    MEDICATIONS  (PRN):  aluminum hydroxide/magnesium hydroxide/simethicone Suspension 30 milliLiter(s) Oral every 4 hours PRN Dyspepsia  guaiFENesin Oral Liquid (Sugar-Free) 200 milliGRAM(s) Oral every 6 hours PRN Cough        Allergies    No Known Allergies    Intolerances        REVIEW OF SYSTEMS:  CONSTITUTIONAL: No fever or chills  HEENT:  No headache, no sore throat  RESPIRATORY: +cough, no shortness of breath at rest on NC; +VEE  CARDIOVASCULAR: No chest pain, palpitations  GASTROINTESTINAL: +abd muscle soreness when coughing; No abd pain, nausea, vomiting, or diarrhea  GENITOURINARY: No dysuria, frequency, or hematuria  NEUROLOGICAL: no focal weakness or dizziness  MUSCULOSKELETAL: no myalgias     Vital Signs Last 24 Hrs  T(C): 36.6 (20 Jan 2022 04:44), Max: 36.7 (19 Jan 2022 21:01)  T(F): 97.8 (20 Jan 2022 04:44), Max: 98 (19 Jan 2022 21:01)  HR: 84 (20 Jan 2022 04:44) (75 - 84)  BP: 154/76 (20 Jan 2022 04:44) (148/78 - 162/68)  BP(mean): --  RR: 18 (20 Jan 2022 04:44) (18 - 18)  SpO2: 92% (20 Jan 2022 04:44) (85% - 92%)    PHYSICAL EXAM:  GENERAL: NAD  HEENT:  anicteric, moist mucous membranes  CHEST/LUNG:  coarse breath sounds b/l  HEART:  RRR, S1, S2  ABDOMEN:  BS+, soft, nontender, nondistended  EXTREMITIES: no cyanosis, or calf tenderness  NERVOUS SYSTEM: answers questions and follows commands appropriately    LABS:                          13.4   7.82  )-----------( 271      ( 20 Jan 2022 07:37 )             38.7     CBC Full  -  ( 20 Jan 2022 07:37 )  WBC Count : 7.82 K/uL  Hemoglobin : 13.4 g/dL  Hematocrit : 38.7 %  Platelet Count - Automated : 271 K/uL  Mean Cell Volume : 87.2 fl  Mean Cell Hemoglobin : 30.2 pg  Mean Cell Hemoglobin Concentration : 34.6 gm/dL  Auto Neutrophil # : 6.56 K/uL  Auto Lymphocyte # : 0.82 K/uL  Auto Monocyte # : 0.40 K/uL  Auto Eosinophil # : 0.00 K/uL  Auto Basophil # : 0.00 K/uL  Auto Neutrophil % : 83.9 %  Auto Lymphocyte % : 10.5 %  Auto Monocyte % : 5.1 %  Auto Eosinophil % : 0.0 %  Auto Basophil % : 0.0 %    01-20    137  |  104  |  20  ----------------------------<  97  3.8   |  25  |  0.78    Ca    8.9      20 Jan 2022 07:37  Phos  2.7     01-20  Mg     2.3     01-20    TPro  6.7  /  Alb  2.7<L>  /  TBili  0.5  /  DBili  x   /  AST  84<H>  /  ALT  60  /  AlkPhos  62  01-20            Culture - Urine (collected 01-18-22 @ 00:43)  Source: Clean Catch Clean Catch (Midstream)  Final Report (01-18-22 @ 19:50):    <10,000 CFU/mL Normal Urogenital Mariel    Culture - Blood (collected 01-17-22 @ 18:06)  Source: .Blood Blood-Peripheral  Preliminary Report (01-18-22 @ 19:02):    No growth to date.    Culture - Blood (collected 01-17-22 @ 17:59)  Source: .Blood Blood-Peripheral  Preliminary Report (01-18-22 @ 18:01):    No growth to date.        RADIOLOGY & ADDITIONAL TESTS:    Personally reviewed.     Consultant(s) Notes Reviewed:  [x] YES  [ ] NO

## 2022-01-20 NOTE — PROGRESS NOTE ADULT - ASSESSMENT
60 yo obese F with pmh Dm2 on insulin. HTN, HLD who presents with 1 week of dry cough, intermittent chest pain induced by coughing, myalgias admitted with acute respiratory failure with hypoxia sec to COVID PNA      ID eval noted  vs noted  labs reviewed  remains on o2 support - escalated to VM   on FULL DOSE Lovenox    serial D dimer  isolation for Covid  robitussin - tylenol for sx management  remdesivir - decadron - covid with hypoxemia  ct chest - viral pna  fio2 titration - keep sat > 88 pct  dvt p  cvs rx regimen for HLD - HTN -   DM care - serial FS -   education  counseling  emotional support

## 2022-01-20 NOTE — PROGRESS NOTE ADULT - ASSESSMENT
62 yo obese F with PMH of Type 2 DM on insulin, HTN, HLD who presents with 1 week of dry cough, intermittent chest pain induced by coughing, myalgias admitted with acute respiratory failure with hypoxia sec to COVID PNA.     Problem/Plan - 1:  ·  Problem: Acute respiratory failure with hypoxia.   ·  Plan: monitor resp status  pulm (Stevo), recs appreciated  maintain sats above 88% with O2 supplemental support.   taper down O2 as able, had to escalate from 6L NS to venturi mask 50% last night and could not wean down today  CTA did not show PE but consistent with COVID PNA     Problem/Plan - 2:  ·  Problem: Pneumonia due to COVID-19 virus.   ·  Plan: pt unvaccinated   cont decadron 6mg daily for 10 days  apprec ID recs Dr Paredes  apprec pulm recs Dr Whiteside  c/w remdesivir (day 4/5)  taper down O2 as able, had to escalate from 6L NS to venturi mask 50% last night and could not wean down today  VTE ppx with full dose lovenox due to high-risk, high d-dimer     Problem/Plan - 3:  ·  Problem: Type 2 diabetes mellitus.   ·  Plan: chronic   c/w mdiss with hypoglycemic protocol  diabetic diet.     Problem/Plan - 4:  ·  Problem: HTN (hypertension).   cont home meds with hold parameters  Amlodipine 10mg po daily   HCTZ 25mg po daily   Metoprolol 50mg po bid      Problem/Plan - 5:  ·  Problem: HLD (hyperlipidemia).   ·  Plan: chronic  Aspirin 81mg daily   Simvastatin 20mg po daily      Problem/Plan - 6:  ·  Problem: Need for prophylactic measure.   ·  Plan: full dose lovenox due to high-risk, high d-dimer  GI ppx with protonix

## 2022-01-20 NOTE — CHART NOTE - NSCHARTNOTEFT_GEN_A_CORE
Called by RN for patient O2 sat 84% while on nasal cannula 6L. Patient placed on 50% VM saturating 95%. VM order placed. RN to call with any changes.

## 2022-01-20 NOTE — PROGRESS NOTE ADULT - SUBJECTIVE AND OBJECTIVE BOX
Date/Time Patient Seen:  		  Referring MD:   Data Reviewed	       Patient is a 61y old  Female who presents with a chief complaint of cough, covid exposure (19 Jan 2022 22:03)      Subjective/HPI     PAST MEDICAL & SURGICAL HISTORY:  HTN (hypertension)  resistant type,, &gt;3 bp meds    DM (diabetes mellitus)  type 2-insulin dependenct    No significant past surgical history          Medication list         MEDICATIONS  (STANDING):  amLODIPine   Tablet 10 milliGRAM(s) Oral daily  aspirin enteric coated 81 milliGRAM(s) Oral daily  benzonatate 100 milliGRAM(s) Oral three times a day  dexAMETHasone     Tablet 6 milliGRAM(s) Oral daily  dextrose 40% Gel 15 Gram(s) Oral once  dextrose 5%. 1000 milliLiter(s) (50 mL/Hr) IV Continuous <Continuous>  dextrose 5%. 1000 milliLiter(s) (100 mL/Hr) IV Continuous <Continuous>  dextrose 50% Injectable 25 Gram(s) IV Push once  dextrose 50% Injectable 12.5 Gram(s) IV Push once  dextrose 50% Injectable 25 Gram(s) IV Push once  enoxaparin Injectable 80 milliGRAM(s) SubCutaneous two times a day  glucagon  Injectable 1 milliGRAM(s) IntraMuscular once  hydrochlorothiazide 25 milliGRAM(s) Oral daily  insulin lispro (ADMELOG) corrective regimen sliding scale   SubCutaneous three times a day before meals  insulin lispro (ADMELOG) corrective regimen sliding scale   SubCutaneous at bedtime  losartan 100 milliGRAM(s) Oral daily  metoprolol tartrate 50 milliGRAM(s) Oral two times a day  remdesivir  IVPB 100 milliGRAM(s) IV Intermittent every 24 hours  remdesivir  IVPB   IV Intermittent   simvastatin 20 milliGRAM(s) Oral at bedtime    MEDICATIONS  (PRN):  aluminum hydroxide/magnesium hydroxide/simethicone Suspension 30 milliLiter(s) Oral every 4 hours PRN Dyspepsia  guaiFENesin Oral Liquid (Sugar-Free) 200 milliGRAM(s) Oral every 6 hours PRN Cough         Vitals log        ICU Vital Signs Last 24 Hrs  T(C): 36.6 (20 Jan 2022 04:44), Max: 36.7 (19 Jan 2022 21:01)  T(F): 97.8 (20 Jan 2022 04:44), Max: 98 (19 Jan 2022 21:01)  HR: 84 (20 Jan 2022 04:44) (75 - 84)  BP: 154/76 (20 Jan 2022 04:44) (148/78 - 162/68)  BP(mean): --  ABP: --  ABP(mean): --  RR: 18 (20 Jan 2022 04:44) (18 - 18)  SpO2: 92% (20 Jan 2022 04:44) (85% - 92%)           Input and Output:  I&O's Detail      Lab Data                        12.8   4.65  )-----------( 175      ( 18 Jan 2022 08:16 )             38.2     01-19    135  |  103  |  20  ----------------------------<  202<H>  5.2   |  27  |  0.97    Ca    9.5      19 Jan 2022 11:42    TPro  7.0  /  Alb  2.7<L>  /  TBili  0.4  /  DBili  <0.1  /  AST  84<H>  /  ALT  52  /  AlkPhos  61  01-19            Review of Systems	      Objective     Physical Examination    heart s1s2  lung dec BS  abd soft      Pertinent Lab findings & Imaging      Frank:  NO   Adequate UO     I&O's Detail           Discussed with:     Cultures:	        Radiology

## 2022-01-21 LAB
ALBUMIN SERPL ELPH-MCNC: 2.8 G/DL — LOW (ref 3.3–5)
ALBUMIN SERPL ELPH-MCNC: 2.9 G/DL — LOW (ref 3.3–5)
ALP SERPL-CCNC: 58 U/L — SIGNIFICANT CHANGE UP (ref 40–120)
ALP SERPL-CCNC: 59 U/L — SIGNIFICANT CHANGE UP (ref 40–120)
ALT FLD-CCNC: 85 U/L — HIGH (ref 12–78)
ALT FLD-CCNC: 85 U/L — HIGH (ref 12–78)
ANION GAP SERPL CALC-SCNC: 6 MMOL/L — SIGNIFICANT CHANGE UP (ref 5–17)
AST SERPL-CCNC: 78 U/L — HIGH (ref 15–37)
AST SERPL-CCNC: 83 U/L — HIGH (ref 15–37)
BASOPHILS # BLD AUTO: 0 K/UL — SIGNIFICANT CHANGE UP (ref 0–0.2)
BASOPHILS NFR BLD AUTO: 0 % — SIGNIFICANT CHANGE UP (ref 0–2)
BILIRUB DIRECT SERPL-MCNC: 0.2 MG/DL — SIGNIFICANT CHANGE UP (ref 0–0.3)
BILIRUB INDIRECT FLD-MCNC: 0.4 MG/DL — SIGNIFICANT CHANGE UP (ref 0.2–1)
BILIRUB SERPL-MCNC: 0.6 MG/DL — SIGNIFICANT CHANGE UP (ref 0.2–1.2)
BILIRUB SERPL-MCNC: 0.6 MG/DL — SIGNIFICANT CHANGE UP (ref 0.2–1.2)
BUN SERPL-MCNC: 24 MG/DL — HIGH (ref 7–23)
CALCIUM SERPL-MCNC: 9.6 MG/DL — SIGNIFICANT CHANGE UP (ref 8.5–10.1)
CHLORIDE SERPL-SCNC: 105 MMOL/L — SIGNIFICANT CHANGE UP (ref 96–108)
CO2 SERPL-SCNC: 26 MMOL/L — SIGNIFICANT CHANGE UP (ref 22–31)
CREAT SERPL-MCNC: 0.87 MG/DL — SIGNIFICANT CHANGE UP (ref 0.5–1.3)
EOSINOPHIL # BLD AUTO: 0 K/UL — SIGNIFICANT CHANGE UP (ref 0–0.5)
EOSINOPHIL NFR BLD AUTO: 0 % — SIGNIFICANT CHANGE UP (ref 0–6)
GLUCOSE SERPL-MCNC: 83 MG/DL — SIGNIFICANT CHANGE UP (ref 70–99)
HCT VFR BLD CALC: 40.6 % — SIGNIFICANT CHANGE UP (ref 34.5–45)
HGB BLD-MCNC: 14.2 G/DL — SIGNIFICANT CHANGE UP (ref 11.5–15.5)
INR BLD: 1.12 RATIO — SIGNIFICANT CHANGE UP (ref 0.88–1.16)
LYMPHOCYTES # BLD AUTO: 0.58 K/UL — LOW (ref 1–3.3)
LYMPHOCYTES # BLD AUTO: 6 % — LOW (ref 13–44)
MAGNESIUM SERPL-MCNC: 2.4 MG/DL — SIGNIFICANT CHANGE UP (ref 1.6–2.6)
MCHC RBC-ENTMCNC: 30.1 PG — SIGNIFICANT CHANGE UP (ref 27–34)
MCHC RBC-ENTMCNC: 35 GM/DL — SIGNIFICANT CHANGE UP (ref 32–36)
MCV RBC AUTO: 86.2 FL — SIGNIFICANT CHANGE UP (ref 80–100)
MONOCYTES # BLD AUTO: 0.29 K/UL — SIGNIFICANT CHANGE UP (ref 0–0.9)
MONOCYTES NFR BLD AUTO: 3 % — SIGNIFICANT CHANGE UP (ref 2–14)
NEUTROPHILS # BLD AUTO: 8.54 K/UL — HIGH (ref 1.8–7.4)
NEUTROPHILS NFR BLD AUTO: 88 % — HIGH (ref 43–77)
NRBC # BLD: SIGNIFICANT CHANGE UP /100 WBCS (ref 0–0)
PLATELET # BLD AUTO: 301 K/UL — SIGNIFICANT CHANGE UP (ref 150–400)
POTASSIUM SERPL-MCNC: 4 MMOL/L — SIGNIFICANT CHANGE UP (ref 3.5–5.3)
POTASSIUM SERPL-SCNC: 4 MMOL/L — SIGNIFICANT CHANGE UP (ref 3.5–5.3)
PROT SERPL-MCNC: 6.5 G/DL — SIGNIFICANT CHANGE UP (ref 6–8.3)
PROT SERPL-MCNC: 7.1 G/DL — SIGNIFICANT CHANGE UP (ref 6–8.3)
PROTHROM AB SERPL-ACNC: 13 SEC — SIGNIFICANT CHANGE UP (ref 10.6–13.6)
RBC # BLD: 4.71 M/UL — SIGNIFICANT CHANGE UP (ref 3.8–5.2)
RBC # FLD: 12.4 % — SIGNIFICANT CHANGE UP (ref 10.3–14.5)
SODIUM SERPL-SCNC: 137 MMOL/L — SIGNIFICANT CHANGE UP (ref 135–145)
WBC # BLD: 9.71 K/UL — SIGNIFICANT CHANGE UP (ref 3.8–10.5)
WBC # FLD AUTO: 9.71 K/UL — SIGNIFICANT CHANGE UP (ref 3.8–10.5)

## 2022-01-21 PROCEDURE — 99232 SBSQ HOSP IP/OBS MODERATE 35: CPT

## 2022-01-21 PROCEDURE — 99233 SBSQ HOSP IP/OBS HIGH 50: CPT

## 2022-01-21 RX ADMIN — REMDESIVIR 500 MILLIGRAM(S): 5 INJECTION INTRAVENOUS at 22:09

## 2022-01-21 RX ADMIN — Medication 50 MILLIGRAM(S): at 17:35

## 2022-01-21 RX ADMIN — Medication 6 MILLIGRAM(S): at 05:24

## 2022-01-21 RX ADMIN — Medication 25 MILLIGRAM(S): at 05:24

## 2022-01-21 RX ADMIN — Medication 100 MILLIGRAM(S): at 13:14

## 2022-01-21 RX ADMIN — ENOXAPARIN SODIUM 80 MILLIGRAM(S): 100 INJECTION SUBCUTANEOUS at 05:24

## 2022-01-21 RX ADMIN — ENOXAPARIN SODIUM 80 MILLIGRAM(S): 100 INJECTION SUBCUTANEOUS at 17:35

## 2022-01-21 RX ADMIN — Medication 100 MILLIGRAM(S): at 05:24

## 2022-01-21 RX ADMIN — AMLODIPINE BESYLATE 10 MILLIGRAM(S): 2.5 TABLET ORAL at 05:24

## 2022-01-21 RX ADMIN — LOSARTAN POTASSIUM 100 MILLIGRAM(S): 100 TABLET, FILM COATED ORAL at 05:24

## 2022-01-21 RX ADMIN — PANTOPRAZOLE SODIUM 40 MILLIGRAM(S): 20 TABLET, DELAYED RELEASE ORAL at 05:24

## 2022-01-21 RX ADMIN — SIMVASTATIN 20 MILLIGRAM(S): 20 TABLET, FILM COATED ORAL at 22:09

## 2022-01-21 RX ADMIN — Medication 81 MILLIGRAM(S): at 11:40

## 2022-01-21 RX ADMIN — Medication 50 MILLIGRAM(S): at 07:15

## 2022-01-21 RX ADMIN — Medication 100 MILLIGRAM(S): at 22:09

## 2022-01-21 NOTE — PROGRESS NOTE ADULT - ASSESSMENT
62 yo female with pmh Dm2 on insulin. HTN, HLD, BMI 29, who presented with 1 week of dry cough, intermittent chest pain induced by coughing, myalgias. Found to have COVID pneumonia, now requiring O2 via ventimask. She remains afebrile and without leukocytosis. Blood cultures remain no growth.    -continue remdesivir (day 5/5) to complete 5 days, if increasing O2 requirements evaluate for tocilizumab  -continue dexamethasone to complete 10 days  -AC per institution protocol    Dulce Boyd MD  Division of infectious Diseases  Cell [phone] between 8am and 6pm  After 6pm and over the weekends please call ID service line at [phone].

## 2022-01-21 NOTE — PROGRESS NOTE ADULT - TIME BILLING
Note written by attending, see above.  Time spent: 40min. More than 50% of the visit was spent counseling the patient and pt's son on medical condition - acute hypoxic resp failure due to COVID PNA, venturi mask vs NC, Remdesivir, decadron.

## 2022-01-21 NOTE — PROGRESS NOTE ADULT - ASSESSMENT
62 yo obese F with pmh Dm2 on insulin. HTN, HLD who presents with 1 week of dry cough, intermittent chest pain induced by coughing, myalgias admitted with acute respiratory failure with hypoxia sec to COVID PNA      ID eval noted  vs noted  labs reviewed  fio2 titration in progress -   on FULL DOSE Lovenox    serial D dimer  isolation for Covid  robitussin - tylenol for sx management  remdesivir - decadron - covid with hypoxemia  ct chest - viral pna  fio2 titration - keep sat > 88 pct  dvt p  [facility]vs rx regimen for HLD - HTN -   DM care - serial FS -   education  counseling  emotional support

## 2022-01-21 NOTE — PROGRESS NOTE ADULT - ASSESSMENT
62yo obese F with PMH of Type 2 DM on insulin, HTN, HLD who presents with 1 week of dry cough, intermittent chest pain induced by coughing, myalgias admitted with acute respiratory failure with hypoxia sec to COVID PNA.     Problem/Plan - 1:  ·  Problem: Acute respiratory failure with hypoxia.   ·  Plan: monitor resp status  pulm (Stevo), recs appreciated  maintain sats above 88% with O2 supplemental support.   taper down O2 as able, currently on venturi mask 50% and could not wean down to NC today  CTA did not show PE but consistent with COVID PNA  cont decadron 6mg daily for 10 days  c/w remdesivir (day 5/5)     Problem/Plan - 2:  ·  Problem: Pneumonia due to COVID-19 virus.   ·  Plan: pt unvaccinated   cont decadron 6mg daily for 10 days  apprec ID recs Dr Paredes  apprec pulm recs Dr Whiteside  c/w remdesivir (day 5/5)  taper down O2 as able, currently on venturi mask 50% and could not wean down to NC today  VTE ppx with full dose lovenox due to high-risk, high d-dimer     Problem/Plan - 3:  ·  Problem: Type 2 diabetes mellitus.   ·  Plan: chronic   c/w mdiss with hypoglycemic protocol  diabetic diet.     Problem/Plan - 4:  ·  Problem: HTN (hypertension).   cont home meds with hold parameters  Amlodipine 10mg po daily   HCTZ 25mg po daily   Metoprolol 50mg po bid      Problem/Plan - 5:  ·  Problem: HLD (hyperlipidemia).   ·  Plan: chronic  Aspirin 81mg daily   Simvastatin 20mg po daily      Problem/Plan - 6:  ·  Problem: Need for prophylactic measure.   ·  Plan: full dose lovenox due to high-risk, high d-dimer  GI ppx with protonix

## 2022-01-21 NOTE — PROGRESS NOTE ADULT - SUBJECTIVE AND OBJECTIVE BOX
Date/Time Patient Seen:  		  Referring MD:   Data Reviewed	       Patient is a 61y old  Female who presents with a chief complaint of cough, covid exposure (20 Jan 2022 05:30)      Subjective/HPI     PAST MEDICAL & SURGICAL HISTORY:  HTN (hypertension)  resistant type,, &gt;3 bp meds    DM (diabetes mellitus)  type 2-insulin dependenct    No significant past surgical history          Medication list         MEDICATIONS  (STANDING):  amLODIPine   Tablet 10 milliGRAM(s) Oral daily  aspirin enteric coated 81 milliGRAM(s) Oral daily  benzonatate 100 milliGRAM(s) Oral three times a day  dexAMETHasone     Tablet 6 milliGRAM(s) Oral daily  dextrose 40% Gel 15 Gram(s) Oral once  dextrose 5%. 1000 milliLiter(s) (50 mL/Hr) IV Continuous <Continuous>  dextrose 5%. 1000 milliLiter(s) (100 mL/Hr) IV Continuous <Continuous>  dextrose 50% Injectable 25 Gram(s) IV Push once  dextrose 50% Injectable 12.5 Gram(s) IV Push once  dextrose 50% Injectable 25 Gram(s) IV Push once  enoxaparin Injectable 80 milliGRAM(s) SubCutaneous two times a day  glucagon  Injectable 1 milliGRAM(s) IntraMuscular once  hydrochlorothiazide 25 milliGRAM(s) Oral daily  insulin lispro (ADMELOG) corrective regimen sliding scale   SubCutaneous three times a day before meals  insulin lispro (ADMELOG) corrective regimen sliding scale   SubCutaneous at bedtime  losartan 100 milliGRAM(s) Oral daily  metoprolol tartrate 50 milliGRAM(s) Oral two times a day  pantoprazole    Tablet 40 milliGRAM(s) Oral before breakfast  remdesivir  IVPB   IV Intermittent   remdesivir  IVPB 100 milliGRAM(s) IV Intermittent every 24 hours  simvastatin 20 milliGRAM(s) Oral at bedtime    MEDICATIONS  (PRN):  aluminum hydroxide/magnesium hydroxide/simethicone Suspension 30 milliLiter(s) Oral every 4 hours PRN Dyspepsia  guaiFENesin Oral Liquid (Sugar-Free) 200 milliGRAM(s) Oral every 6 hours PRN Cough         Vitals log        ICU Vital Signs Last 24 Hrs  T(C): 36.7 (21 Jan 2022 05:12), Max: 36.8 (20 Jan 2022 20:25)  T(F): 98.1 (21 Jan 2022 05:12), Max: 98.3 (20 Jan 2022 20:25)  HR: 67 (21 Jan 2022 05:12) (63 - 80)  BP: 178/72 (21 Jan 2022 05:12) (124/73 - 178/72)  BP(mean): --  ABP: --  ABP(mean): --  RR: 18 (21 Jan 2022 05:12) (17 - 18)  SpO2: 94% (21 Jan 2022 05:12) (88% - 97%)           Input and Output:  I&O's Detail      Lab Data                        13.4   7.82  )-----------( 271      ( 20 Jan 2022 07:37 )             38.7     01-20    137  |  104  |  20  ----------------------------<  97  3.8   |  25  |  0.78    Ca    8.9      20 Jan 2022 07:37  Phos  2.7     01-20  Mg     2.3     01-20    TPro  6.7  /  Alb  2.7<L>  /  TBili  0.5  /  DBili  x   /  AST  84<H>  /  ALT  60  /  AlkPhos  62  01-20            Review of Systems	      Objective     Physical Examination  heart s1s2  lung dec BS  abd soft  head nc        Pertinent Lab findings & Imaging      Frank:  NO   Adequate UO     I&O's Detail           Discussed with:     Cultures:	        Radiology

## 2022-01-21 NOTE — PROGRESS NOTE ADULT - SUBJECTIVE AND OBJECTIVE BOX
Patient is a 61y old  Female who presents with a chief complaint of cough, SOB.       INTERVAL HPI/OVERNIGHT EVENTS: Attempted to titrate down to NC from 50% venturi mask, but pt desaturated to the mid-80s. Pt admits VEE and cough. Denies SOB at rest on venturi mask. Denies nausea, vomiting, diarrhea, CP, fever, chills.    MEDICATIONS  (STANDING):  amLODIPine   Tablet 10 milliGRAM(s) Oral daily  aspirin enteric coated 81 milliGRAM(s) Oral daily  benzonatate 100 milliGRAM(s) Oral three times a day  dexAMETHasone     Tablet 6 milliGRAM(s) Oral daily  dextrose 40% Gel 15 Gram(s) Oral once  dextrose 5%. 1000 milliLiter(s) (50 mL/Hr) IV Continuous <Continuous>  dextrose 5%. 1000 milliLiter(s) (100 mL/Hr) IV Continuous <Continuous>  dextrose 50% Injectable 25 Gram(s) IV Push once  dextrose 50% Injectable 25 Gram(s) IV Push once  dextrose 50% Injectable 12.5 Gram(s) IV Push once  enoxaparin Injectable 80 milliGRAM(s) SubCutaneous two times a day  glucagon  Injectable 1 milliGRAM(s) IntraMuscular once  hydrochlorothiazide 25 milliGRAM(s) Oral daily  insulin lispro (ADMELOG) corrective regimen sliding scale   SubCutaneous three times a day before meals  insulin lispro (ADMELOG) corrective regimen sliding scale   SubCutaneous at bedtime  losartan 100 milliGRAM(s) Oral daily  metoprolol tartrate 50 milliGRAM(s) Oral two times a day  pantoprazole    Tablet 40 milliGRAM(s) Oral before breakfast  remdesivir  IVPB 100 milliGRAM(s) IV Intermittent every 24 hours  remdesivir  IVPB   IV Intermittent   simvastatin 20 milliGRAM(s) Oral at bedtime    MEDICATIONS  (PRN):  aluminum hydroxide/magnesium hydroxide/simethicone Suspension 30 milliLiter(s) Oral every 4 hours PRN Dyspepsia  guaiFENesin Oral Liquid (Sugar-Free) 200 milliGRAM(s) Oral every 6 hours PRN Cough        Allergies    No Known Allergies    Intolerances        REVIEW OF SYSTEMS:  CONSTITUTIONAL: No fever or chills  HEENT:  No headache, no sore throat  RESPIRATORY: +cough, no shortness of breath at rest on NC; +VEE  CARDIOVASCULAR: No chest pain, palpitations  GASTROINTESTINAL: No abd pain, nausea, vomiting, or diarrhea  GENITOURINARY: No dysuria, frequency, or hematuria  NEUROLOGICAL: no focal weakness or dizziness  MUSCULOSKELETAL: no myalgias     Vital Signs Last 24 Hrs  T(C): 36.7 (21 Jan 2022 12:46), Max: 36.8 (20 Jan 2022 20:25)  T(F): 98.1 (21 Jan 2022 12:46), Max: 98.3 (20 Jan 2022 20:25)  HR: 75 (21 Jan 2022 12:46) (63 - 80)  BP: 150/75 (21 Jan 2022 12:46) (147/73 - 178/72)  BP(mean): --  RR: 17 (21 Jan 2022 12:46) (17 - 18)  SpO2: 96% (21 Jan 2022 12:46) (87% - 97%)    PHYSICAL EXAM:  GENERAL: NAD  HEENT:  anicteric, moist mucous membranes  CHEST/LUNG:  coarse breath sounds b/l  HEART:  RRR, S1, S2  ABDOMEN:  BS+, soft, nontender, nondistended  EXTREMITIES: no cyanosis, or calf tenderness  NERVOUS SYSTEM: answers questions and follows commands appropriately    LABS:                                     14.2   9.71  )-----------( 301      ( 21 Jan 2022 09:20 )             40.6     CBC Full  -  ( 21 Jan 2022 09:20 )  WBC Count : 9.71 K/uL  Hemoglobin : 14.2 g/dL  Hematocrit : 40.6 %  Platelet Count - Automated : 301 K/uL  Mean Cell Volume : 86.2 fl  Mean Cell Hemoglobin : 30.1 pg  Mean Cell Hemoglobin Concentration : 35.0 gm/dL  Auto Neutrophil # : 8.54 K/uL  Auto Lymphocyte # : 0.58 K/uL  Auto Monocyte # : 0.29 K/uL  Auto Eosinophil # : 0.00 K/uL  Auto Basophil # : 0.00 K/uL  Auto Neutrophil % : 88.0 %  Auto Lymphocyte % : 6.0 %  Auto Monocyte % : 3.0 %  Auto Eosinophil % : 0.0 %  Auto Basophil % : 0.0 %    01-21    137  |  105  |  24<H>  ----------------------------<  83  4.0   |  26  |  0.87    Ca    9.6      21 Jan 2022 09:20  Phos  2.7     01-20  Mg     2.4     01-21    TPro  7.1  /  Alb  2.8<L>  /  TBili  0.6  /  DBili  0.2  /  AST  78<H>  /  ALT  85<H>  /  AlkPhos  59  01-21              Culture - Urine (collected 01-18-22 @ 00:43)  Source: Clean Catch Clean Catch (Midstream)  Final Report (01-18-22 @ 19:50):    <10,000 CFU/mL Normal Urogenital Mariel    Culture - Blood (collected 01-17-22 @ 18:06)  Source: .Blood Blood-Peripheral  Preliminary Report (01-18-22 @ 19:02):    No growth to date.    Culture - Blood (collected 01-17-22 @ 17:59)  Source: .Blood Blood-Peripheral  Preliminary Report (01-18-22 @ 18:01):    No growth to date.        RADIOLOGY & ADDITIONAL TESTS:    Personally reviewed.     Consultant(s) Notes Reviewed:  [x] YES  [ ] NO     Patient is a 61y old  Female who presents with a chief complaint of cough, SOB.        INTERVAL HPI/OVERNIGHT EVENTS: Attempted to titrate down to NC from 50% venturi mask, but pt desaturated to the mid-80s. Pt admits VEE and cough. Denies SOB at rest on venturi mask. Denies nausea, vomiting, diarrhea, CP, fever, chills.    MEDICATIONS  (STANDING):  amLODIPine   Tablet 10 milliGRAM(s) Oral daily  aspirin enteric coated 81 milliGRAM(s) Oral daily  benzonatate 100 milliGRAM(s) Oral three times a day  dexAMETHasone     Tablet 6 milliGRAM(s) Oral daily  dextrose 40% Gel 15 Gram(s) Oral once  dextrose 5%. 1000 milliLiter(s) (50 mL/Hr) IV Continuous <Continuous>  dextrose 5%. 1000 milliLiter(s) (100 mL/Hr) IV Continuous <Continuous>  dextrose 50% Injectable 25 Gram(s) IV Push once  dextrose 50% Injectable 25 Gram(s) IV Push once  dextrose 50% Injectable 12.5 Gram(s) IV Push once  enoxaparin Injectable 80 milliGRAM(s) SubCutaneous two times a day  glucagon  Injectable 1 milliGRAM(s) IntraMuscular once  hydrochlorothiazide 25 milliGRAM(s) Oral daily  insulin lispro (ADMELOG) corrective regimen sliding scale   SubCutaneous three times a day before meals  insulin lispro (ADMELOG) corrective regimen sliding scale   SubCutaneous at bedtime  losartan 100 milliGRAM(s) Oral daily  metoprolol tartrate 50 milliGRAM(s) Oral two times a day  pantoprazole    Tablet 40 milliGRAM(s) Oral before breakfast  remdesivir  IVPB 100 milliGRAM(s) IV Intermittent every 24 hours  remdesivir  IVPB   IV Intermittent   simvastatin 20 milliGRAM(s) Oral at bedtime    MEDICATIONS  (PRN):  aluminum hydroxide/magnesium hydroxide/simethicone Suspension 30 milliLiter(s) Oral every 4 hours PRN Dyspepsia  guaiFENesin Oral Liquid (Sugar-Free) 200 milliGRAM(s) Oral every 6 hours PRN Cough        Allergies    No Known Allergies    Intolerances        REVIEW OF SYSTEMS:  CONSTITUTIONAL: No fever or chills  HEENT:  No headache, no sore throat  RESPIRATORY: +cough, no shortness of breath at rest on NC; +VEE  CARDIOVASCULAR: No chest pain, palpitations  GASTROINTESTINAL: No abd pain, nausea, vomiting, or diarrhea  GENITOURINARY: No dysuria, frequency, or hematuria  NEUROLOGICAL: no focal weakness or dizziness  MUSCULOSKELETAL: no myalgias     Vital Signs Last 24 Hrs  T(C): 36.7 (21 Jan 2022 12:46), Max: 36.8 (20 Jan 2022 20:25)  T(F): 98.1 (21 Jan 2022 12:46), Max: 98.3 (20 Jan 2022 20:25)  HR: 75 (21 Jan 2022 12:46) (63 - 80)  BP: 150/75 (21 Jan 2022 12:46) (147/73 - 178/72)  BP(mean): --  RR: 17 (21 Jan 2022 12:46) (17 - 18)  SpO2: 96% (21 Jan 2022 12:46) (87% - 97%)    PHYSICAL EXAM:  GENERAL: NAD  HEENT:  anicteric, moist mucous membranes  CHEST/LUNG:  coarse breath sounds b/l  HEART:  RRR, S1, S2  ABDOMEN:  BS+, soft, nontender, nondistended  EXTREMITIES: no cyanosis, or calf tenderness  NERVOUS SYSTEM: answers questions and follows commands appropriately    LABS:                                     14.2   9.71  )-----------( 301      ( 21 Jan 2022 09:20 )             40.6     CBC Full  -  ( 21 Jan 2022 09:20 )  WBC Count : 9.71 K/uL  Hemoglobin : 14.2 g/dL  Hematocrit : 40.6 %  Platelet Count - Automated : 301 K/uL  Mean Cell Volume : 86.2 fl  Mean Cell Hemoglobin : 30.1 pg  Mean Cell Hemoglobin Concentration : 35.0 gm/dL  Auto Neutrophil # : 8.54 K/uL  Auto Lymphocyte # : 0.58 K/uL  Auto Monocyte # : 0.29 K/uL  Auto Eosinophil # : 0.00 K/uL  Auto Basophil # : 0.00 K/uL  Auto Neutrophil % : 88.0 %  Auto Lymphocyte % : 6.0 %  Auto Monocyte % : 3.0 %  Auto Eosinophil % : 0.0 %  Auto Basophil % : 0.0 %    01-21    137  |  105  |  24<H>  ----------------------------<  83  4.0   |  26  |  0.87    Ca    9.6      21 Jan 2022 09:20  Phos  2.7     01-20  Mg     2.4     01-21    TPro  7.1  /  Alb  2.8<L>  /  TBili  0.6  /  DBili  0.2  /  AST  78<H>  /  ALT  85<H>  /  AlkPhos  59  01-21              Culture - Urine (collected 01-18-22 @ 00:43)  Source: Clean Catch Clean Catch (Midstream)  Final Report (01-18-22 @ 19:50):    <10,000 CFU/mL Normal Urogenital Mariel    Culture - Blood (collected 01-17-22 @ 18:06)  Source: .Blood Blood-Peripheral  Preliminary Report (01-18-22 @ 19:02):    No growth to date.    Culture - Blood (collected 01-17-22 @ 17:59)  Source: .Blood Blood-Peripheral  Preliminary Report (01-18-22 @ 18:01):    No growth to date.        RADIOLOGY & ADDITIONAL TESTS:    Personally reviewed.     Consultant(s) Notes Reviewed:  [x] YES  [ ] NO

## 2022-01-21 NOTE — PROGRESS NOTE ADULT - SUBJECTIVE AND OBJECTIVE BOX
[facility]  INFECTIOUS DISEASES - [address], [place] [address]  Tel: [phone]     Fax: [phone]  =======================================================    JIMY BELLO [id number]    Follow up: No fevers. Now on ventimask. Mandarin translation by [employer]  #[id number]. Patient says still has cough and has occasional substernal pain while coughing. Stomach feels "funny" sometimes but denies any nausea/vomiting/diarrhea.    Allergies:  No Known Allergies      Antibiotics:  aluminum hydroxide/magnesium hydroxide/simethicone Suspension 30 milliLiter(s) Oral every 4 hours PRN  amLODIPine   Tablet 10 milliGRAM(s) Oral daily  aspirin enteric coated 81 milliGRAM(s) Oral daily  benzonatate 100 milliGRAM(s) Oral three times a day  dexAMETHasone     Tablet 6 milliGRAM(s) Oral daily  dextrose 40% Gel 15 Gram(s) Oral once  dextrose 5%. 1000 milliLiter(s) IV Continuous <Continuous>  dextrose 5%. 1000 milliLiter(s) IV Continuous <Continuous>  dextrose 50% Injectable 25 Gram(s) IV Push once  dextrose 50% Injectable 12.5 Gram(s) IV Push once  dextrose 50% Injectable 25 Gram(s) IV Push once  enoxaparin Injectable 80 milliGRAM(s) SubCutaneous two times a day  glucagon  Injectable 1 milliGRAM(s) IntraMuscular once  guaiFENesin Oral Liquid (Sugar-Free) 200 milliGRAM(s) Oral every 6 hours PRN  hydrochlorothiazide 25 milliGRAM(s) Oral daily  insulin lispro (ADMELOG) corrective regimen sliding scale   SubCutaneous three times a day before meals  insulin lispro (ADMELOG) corrective regimen sliding scale   SubCutaneous at bedtime  losartan 100 milliGRAM(s) Oral daily  metoprolol tartrate 50 milliGRAM(s) Oral two times a day  pantoprazole    Tablet 40 milliGRAM(s) Oral before breakfast  remdesivir  IVPB 100 milliGRAM(s) IV Intermittent every 24 hours  remdesivir  IVPB   IV Intermittent   simvastatin 20 milliGRAM(s) Oral at bedtime       REVIEW OF SYSTEMS:  CONSTITUTIONAL:  No Fever or chills  CARDIOVASCULAR:  No chest pain   RESPIRATORY: see history  GASTROINTESTINAL:  see history  GENITOURINARY:  No dysuria  NEUROLOGIC:  No headache, no dizziness  PSYCHIATRIC:  No disorder of thought or mood.     Physical Exam:  ICU Vital Signs Last 24 Hrs  T(C): 36.7 (21 Jan 2022 12:46), Max: 36.8 (20 Jan 2022 20:25)  T(F): 98.1 (21 Jan 2022 12:46), Max: 98.3 (20 Jan 2022 20:25)  HR: 75 (21 Jan 2022 12:46) (63 - 80)  BP: 150/75 (21 Jan 2022 12:46) (147/73 - 178/72)  BP(mean): --  ABP: --  ABP(mean): --  RR: 17 (21 Jan 2022 12:46) (17 - 18)  SpO2: 96% (21 Jan 2022 12:46) (87% - 97%)     GEN: NAD  HEENT: normocephalic and atraumatic.   NECK: Supple.    LUNGS: normal respiratory effort  HEART: Regular rate and rhythm  ABDOMEN: Soft, nontender, and nondistended.   EXTREMITIES: no leg edema.  NEUROLOGIC: answering questions appropriately  PSYCHIATRIC: Appropriate affect .    Labs:  01-21    137  |  105  |  24<H>  ----------------------------<  83  4.0   |  26  |  0.87    Ca    9.6      21 Jan 2022 09:20  Phos  2.7     01-20  Mg     2.4     01-21    TPro  7.1  /  Alb  2.8<L>  /  TBili  0.6  /  DBili  0.2  /  AST  78<H>  /  ALT  85<H>  /  AlkPhos  59  01-21                          14.2   9.71  )-----------( 301      ( 21 Jan 2022 09:20 )             40.6     PT/INR - ( 21 Jan 2022 09:20 )   PT: 13.0 sec;   INR: 1.12 ratio             LIVER FUNCTIONS - ( 21 Jan 2022 09:20 )  Alb: 2.8 g/dL / Pro: 7.1 g/dL / ALK PHOS: 59 U/L / ALT: 85 U/L / AST: 78 U/L / GGT: x             RECENT CULTURES:  01-18 @ 00:43 Clean Catch Clean Catch (Midstream)     <10,000 CFU/mL Normal Urogenital Mariel        01-17 @ 18:06 .Blood Blood-Peripheral     No growth to date.        01-17 @ 17:59 .Blood Blood-Peripheral     No growth to date.        01-17 @ 14:09          Detected        All imaging and data are reviewed.

## 2022-01-22 LAB
ALBUMIN SERPL ELPH-MCNC: 3 G/DL — LOW (ref 3.3–5)
ALP SERPL-CCNC: 63 U/L — SIGNIFICANT CHANGE UP (ref 40–120)
ALT FLD-CCNC: 89 U/L — HIGH (ref 12–78)
ANION GAP SERPL CALC-SCNC: 7 MMOL/L — SIGNIFICANT CHANGE UP (ref 5–17)
AST SERPL-CCNC: 66 U/L — HIGH (ref 15–37)
BASOPHILS # BLD AUTO: 0.01 K/UL — SIGNIFICANT CHANGE UP (ref 0–0.2)
BASOPHILS NFR BLD AUTO: 0.1 % — SIGNIFICANT CHANGE UP (ref 0–2)
BILIRUB SERPL-MCNC: 0.5 MG/DL — SIGNIFICANT CHANGE UP (ref 0.2–1.2)
BUN SERPL-MCNC: 27 MG/DL — HIGH (ref 7–23)
CALCIUM SERPL-MCNC: 9.7 MG/DL — SIGNIFICANT CHANGE UP (ref 8.5–10.1)
CHLORIDE SERPL-SCNC: 102 MMOL/L — SIGNIFICANT CHANGE UP (ref 96–108)
CK MB BLD-MCNC: 1 % — SIGNIFICANT CHANGE UP (ref 0–3.5)
CK MB CFR SERPL CALC: 4.1 NG/ML — HIGH (ref 0–3.6)
CK SERPL-CCNC: 419 U/L — HIGH (ref 26–192)
CO2 SERPL-SCNC: 26 MMOL/L — SIGNIFICANT CHANGE UP (ref 22–31)
CREAT SERPL-MCNC: 1 MG/DL — SIGNIFICANT CHANGE UP (ref 0.5–1.3)
CULTURE RESULTS: SIGNIFICANT CHANGE UP
CULTURE RESULTS: SIGNIFICANT CHANGE UP
EOSINOPHIL # BLD AUTO: 0 K/UL — SIGNIFICANT CHANGE UP (ref 0–0.5)
EOSINOPHIL NFR BLD AUTO: 0 % — SIGNIFICANT CHANGE UP (ref 0–6)
GLUCOSE SERPL-MCNC: 66 MG/DL — LOW (ref 70–99)
HCT VFR BLD CALC: 43.1 % — SIGNIFICANT CHANGE UP (ref 34.5–45)
HGB BLD-MCNC: 14.8 G/DL — SIGNIFICANT CHANGE UP (ref 11.5–15.5)
IMM GRANULOCYTES NFR BLD AUTO: 0.6 % — SIGNIFICANT CHANGE UP (ref 0–1.5)
LIDOCAIN IGE QN: 95 U/L — SIGNIFICANT CHANGE UP (ref 73–393)
LYMPHOCYTES # BLD AUTO: 0.94 K/UL — LOW (ref 1–3.3)
LYMPHOCYTES # BLD AUTO: 7.4 % — LOW (ref 13–44)
MAGNESIUM SERPL-MCNC: 2.4 MG/DL — SIGNIFICANT CHANGE UP (ref 1.6–2.6)
MCHC RBC-ENTMCNC: 29.7 PG — SIGNIFICANT CHANGE UP (ref 27–34)
MCHC RBC-ENTMCNC: 34.3 GM/DL — SIGNIFICANT CHANGE UP (ref 32–36)
MCV RBC AUTO: 86.4 FL — SIGNIFICANT CHANGE UP (ref 80–100)
MONOCYTES # BLD AUTO: 0.39 K/UL — SIGNIFICANT CHANGE UP (ref 0–0.9)
MONOCYTES NFR BLD AUTO: 3.1 % — SIGNIFICANT CHANGE UP (ref 2–14)
NEUTROPHILS # BLD AUTO: 11.23 K/UL — HIGH (ref 1.8–7.4)
NEUTROPHILS NFR BLD AUTO: 88.8 % — HIGH (ref 43–77)
NRBC # BLD: 0 /100 WBCS — SIGNIFICANT CHANGE UP (ref 0–0)
PLATELET # BLD AUTO: 355 K/UL — SIGNIFICANT CHANGE UP (ref 150–400)
POTASSIUM SERPL-MCNC: 3.6 MMOL/L — SIGNIFICANT CHANGE UP (ref 3.5–5.3)
POTASSIUM SERPL-SCNC: 3.6 MMOL/L — SIGNIFICANT CHANGE UP (ref 3.5–5.3)
PROT SERPL-MCNC: 7.3 G/DL — SIGNIFICANT CHANGE UP (ref 6–8.3)
RBC # BLD: 4.99 M/UL — SIGNIFICANT CHANGE UP (ref 3.8–5.2)
RBC # FLD: 12.3 % — SIGNIFICANT CHANGE UP (ref 10.3–14.5)
SODIUM SERPL-SCNC: 135 MMOL/L — SIGNIFICANT CHANGE UP (ref 135–145)
SPECIMEN SOURCE: SIGNIFICANT CHANGE UP
SPECIMEN SOURCE: SIGNIFICANT CHANGE UP
TROPONIN I, HIGH SENSITIVITY RESULT: 11.9 NG/L — SIGNIFICANT CHANGE UP
WBC # BLD: 12.65 K/UL — HIGH (ref 3.8–10.5)
WBC # FLD AUTO: 12.65 K/UL — HIGH (ref 3.8–10.5)

## 2022-01-22 PROCEDURE — 99233 SBSQ HOSP IP/OBS HIGH 50: CPT

## 2022-01-22 PROCEDURE — 93010 ELECTROCARDIOGRAM REPORT: CPT

## 2022-01-22 RX ADMIN — AMLODIPINE BESYLATE 10 MILLIGRAM(S): 2.5 TABLET ORAL at 06:11

## 2022-01-22 RX ADMIN — Medication 25 MILLIGRAM(S): at 06:11

## 2022-01-22 RX ADMIN — PANTOPRAZOLE SODIUM 40 MILLIGRAM(S): 20 TABLET, DELAYED RELEASE ORAL at 06:11

## 2022-01-22 RX ADMIN — ENOXAPARIN SODIUM 80 MILLIGRAM(S): 100 INJECTION SUBCUTANEOUS at 16:59

## 2022-01-22 RX ADMIN — Medication 50 MILLIGRAM(S): at 06:11

## 2022-01-22 RX ADMIN — Medication 81 MILLIGRAM(S): at 12:07

## 2022-01-22 RX ADMIN — Medication 50 MILLIGRAM(S): at 17:02

## 2022-01-22 RX ADMIN — Medication 100 MILLIGRAM(S): at 06:11

## 2022-01-22 RX ADMIN — Medication 6 MILLIGRAM(S): at 06:11

## 2022-01-22 RX ADMIN — ENOXAPARIN SODIUM 80 MILLIGRAM(S): 100 INJECTION SUBCUTANEOUS at 06:11

## 2022-01-22 RX ADMIN — Medication 100 MILLIGRAM(S): at 12:07

## 2022-01-22 RX ADMIN — Medication 100 MILLIGRAM(S): at 21:33

## 2022-01-22 RX ADMIN — Medication 30 MILLILITER(S): at 16:56

## 2022-01-22 RX ADMIN — LOSARTAN POTASSIUM 100 MILLIGRAM(S): 100 TABLET, FILM COATED ORAL at 06:11

## 2022-01-22 RX ADMIN — SIMVASTATIN 20 MILLIGRAM(S): 20 TABLET, FILM COATED ORAL at 21:33

## 2022-01-22 NOTE — PROGRESS NOTE ADULT - SUBJECTIVE AND OBJECTIVE BOX
Date/Time Patient Seen:  		  Referring MD:   Data Reviewed	       Patient is a 61y old  Female who presents with a chief complaint of acute hypoxic resp failure due to COVID PNA (21 Jan 2022 17:13)      Subjective/HPI     PAST MEDICAL & SURGICAL HISTORY:  HTN (hypertension)  resistant type,, &gt;3 bp meds    DM (diabetes mellitus)  type 2-insulin dependenct    No significant past surgical history          Medication list         MEDICATIONS  (STANDING):  amLODIPine   Tablet 10 milliGRAM(s) Oral daily  aspirin enteric coated 81 milliGRAM(s) Oral daily  benzonatate 100 milliGRAM(s) Oral three times a day  dexAMETHasone     Tablet 6 milliGRAM(s) Oral daily  dextrose 40% Gel 15 Gram(s) Oral once  dextrose 5%. 1000 milliLiter(s) (50 mL/Hr) IV Continuous <Continuous>  dextrose 5%. 1000 milliLiter(s) (100 mL/Hr) IV Continuous <Continuous>  dextrose 50% Injectable 25 Gram(s) IV Push once  dextrose 50% Injectable 12.5 Gram(s) IV Push once  dextrose 50% Injectable 25 Gram(s) IV Push once  enoxaparin Injectable 80 milliGRAM(s) SubCutaneous two times a day  glucagon  Injectable 1 milliGRAM(s) IntraMuscular once  hydrochlorothiazide 25 milliGRAM(s) Oral daily  insulin lispro (ADMELOG) corrective regimen sliding scale   SubCutaneous three times a day before meals  insulin lispro (ADMELOG) corrective regimen sliding scale   SubCutaneous at bedtime  losartan 100 milliGRAM(s) Oral daily  metoprolol tartrate 50 milliGRAM(s) Oral two times a day  pantoprazole    Tablet 40 milliGRAM(s) Oral before breakfast  simvastatin 20 milliGRAM(s) Oral at bedtime    MEDICATIONS  (PRN):  aluminum hydroxide/magnesium hydroxide/simethicone Suspension 30 milliLiter(s) Oral every 4 hours PRN Dyspepsia  guaiFENesin Oral Liquid (Sugar-Free) 200 milliGRAM(s) Oral every 6 hours PRN Cough         Vitals log        ICU Vital Signs Last 24 Hrs  T(C): 36.6 (22 Jan 2022 05:09), Max: 36.8 (21 Jan 2022 20:43)  T(F): 97.8 (22 Jan 2022 05:09), Max: 98.3 (21 Jan 2022 20:43)  HR: 81 (22 Jan 2022 05:09) (68 - 82)  BP: 146/74 (22 Jan 2022 05:09) (133/83 - 163/76)  BP(mean): --  ABP: --  ABP(mean): --  RR: 17 (22 Jan 2022 05:09) (17 - 17)  SpO2: 98% (22 Jan 2022 05:09) (87% - 98%)           Input and Output:  I&O's Detail      Lab Data                        14.2   9.71  )-----------( 301      ( 21 Jan 2022 09:20 )             40.6     01-21    137  |  105  |  24<H>  ----------------------------<  83  4.0   |  26  |  0.87    Ca    9.6      21 Jan 2022 09:20  Phos  2.7     01-20  Mg     2.4     01-21    TPro  7.1  /  Alb  2.8<L>  /  TBili  0.6  /  DBili  0.2  /  AST  78<H>  /  ALT  85<H>  /  AlkPhos  59  01-21            Review of Systems	      Objective     Physical Examination    heart 1s2  lung dec BS  abd soft      Pertinent Lab findings & Imaging      Frank:  NO   Adequate UO     I&O's Detail           Discussed with:     Cultures:	        Radiology

## 2022-01-22 NOTE — PROGRESS NOTE ADULT - SUBJECTIVE AND OBJECTIVE BOX
Patient is a 61y old  Female who presents with a chief complaint of cough, SOB.        INTERVAL HPI/OVERNIGHT EVENTS: This morning before breakfast pt's FSG was in the high-60s. Pt was asymptomatic, was given breakfast and FSG sujit to 117. Of note, pt had not been on any standing insulin and had not received any coverage insulin in >48hours.  This evening, pt complained of chest pain. She stated it was mild, pressure-like, in the lower substernal area / upper epigastric area. Pt states she feels it might be heartburn, but is not certain.   Pt admits VEE and cough unchanged from yesterday. Denies SOB at rest on venturi mask. Denies nausea, vomiting, diarrhea, fever, chills.    MEDICATIONS  (STANDING):  amLODIPine   Tablet 10 milliGRAM(s) Oral daily  aspirin enteric coated 81 milliGRAM(s) Oral daily  benzonatate 100 milliGRAM(s) Oral three times a day  dexAMETHasone     Tablet 6 milliGRAM(s) Oral daily  dextrose 40% Gel 15 Gram(s) Oral once  dextrose 5%. 1000 milliLiter(s) (50 mL/Hr) IV Continuous <Continuous>  dextrose 5%. 1000 milliLiter(s) (100 mL/Hr) IV Continuous <Continuous>  dextrose 50% Injectable 25 Gram(s) IV Push once  dextrose 50% Injectable 12.5 Gram(s) IV Push once  dextrose 50% Injectable 25 Gram(s) IV Push once  enoxaparin Injectable 80 milliGRAM(s) SubCutaneous two times a day  glucagon  Injectable 1 milliGRAM(s) IntraMuscular once  hydrochlorothiazide 25 milliGRAM(s) Oral daily  losartan 100 milliGRAM(s) Oral daily  metoprolol tartrate 50 milliGRAM(s) Oral two times a day  pantoprazole    Tablet 40 milliGRAM(s) Oral before breakfast  simvastatin 20 milliGRAM(s) Oral at bedtime    MEDICATIONS  (PRN):  aluminum hydroxide/magnesium hydroxide/simethicone Suspension 30 milliLiter(s) Oral every 4 hours PRN Dyspepsia  guaiFENesin Oral Liquid (Sugar-Free) 200 milliGRAM(s) Oral every 6 hours PRN Cough          Allergies    No Known Allergies    Intolerances        REVIEW OF SYSTEMS:  CONSTITUTIONAL: No fever or chills  HEENT:  No headache, no sore throat  RESPIRATORY: +cough, no shortness of breath at rest on venturi mask; +VEE  CARDIOVASCULAR: +chest pain (see above), no palpitations  GASTROINTESTINAL: No abd pain, nausea, vomiting, or diarrhea  GENITOURINARY: No dysuria, frequency, or hematuria  NEUROLOGICAL: no focal weakness or dizziness  MUSCULOSKELETAL: no myalgias     Vital Signs Last 24 Hrs  T(C): 36.5 (22 Jan 2022 21:04), Max: 36.8 (22 Jan 2022 12:53)  T(F): 97.7 (22 Jan 2022 21:04), Max: 98.2 (22 Jan 2022 12:53)  HR: 71 (22 Jan 2022 21:04) (71 - 81)  BP: 134/72 (22 Jan 2022 21:04) (134/72 - 146/74)  BP(mean): --  RR: 17 (22 Jan 2022 21:04) (17 - 17)  SpO2: 92% (22 Jan 2022 21:04) (92% - 98%)    PHYSICAL EXAM:  GENERAL: NAD at rest   HEENT:  anicteric, moist mucous membranes  CHEST/LUNG:  coarse breath sounds b/l  HEART:  RRR, S1, S2  ABDOMEN:  BS+, soft, nontender, nondistended  EXTREMITIES: no cyanosis, or calf tenderness  NERVOUS SYSTEM: answers questions and follows commands appropriately    LABS:                                     14.8   12.65 )-----------( 355      ( 22 Jan 2022 08:33 )             43.1     CBC Full  -  ( 22 Jan 2022 08:33 )  WBC Count : 12.65 K/uL  Hemoglobin : 14.8 g/dL  Hematocrit : 43.1 %  Platelet Count - Automated : 355 K/uL  Mean Cell Volume : 86.4 fl  Mean Cell Hemoglobin : 29.7 pg  Mean Cell Hemoglobin Concentration : 34.3 gm/dL  Auto Neutrophil # : 11.23 K/uL  Auto Lymphocyte # : 0.94 K/uL  Auto Monocyte # : 0.39 K/uL  Auto Eosinophil # : 0.00 K/uL  Auto Basophil # : 0.01 K/uL  Auto Neutrophil % : 88.8 %  Auto Lymphocyte % : 7.4 %  Auto Monocyte % : 3.1 %  Auto Eosinophil % : 0.0 %  Auto Basophil % : 0.1 %    01-22    135  |  102  |  27<H>  ----------------------------<  66<L>  3.6   |  26  |  1.00    Ca    9.7      22 Jan 2022 08:33  Mg     2.4     01-22    TPro  7.3  /  Alb  3.0<L>  /  TBili  0.5  /  DBili  x   /  AST  66<H>  /  ALT  89<H>  /  AlkPhos  63  01-22    CARDIAC MARKERS ( 22 Jan 2022 18:01 )  x     / x     / 419 U/L / x     / 4.1 ng/mL    HS troponin: 11.9  lipase: 95          Culture - Urine (collected 01-18-22 @ 00:43)  Source: Clean Catch Clean Catch (Midstream)  Final Report (01-18-22 @ 19:50):    <10,000 CFU/mL Normal Urogenital Mariel    Culture - Blood (collected 01-17-22 @ 18:06)  Source: .Blood Blood-Peripheral  Preliminary Report (01-18-22 @ 19:02):    No growth to date.    Culture - Blood (collected 01-17-22 @ 17:59)  Source: .Blood Blood-Peripheral  Preliminary Report (01-18-22 @ 18:01):    No growth to date.        RADIOLOGY & ADDITIONAL TESTS:    Personally reviewed.     Consultant(s) Notes Reviewed:  [x] YES  [ ] NO     Patient is a 61y old  Female who presents with a chief complaint of cough, SOB.        INTERVAL HPI/OVERNIGHT EVENTS: This morning before breakfast pt's FSG was in the high-60s. Pt was asymptomatic, was given breakfast and FSG sujit to 117. Of note, pt had not been on any standing insulin and had not received any coverage insulin in >48hours.  This evening, pt complained of chest pain. She stated it was mild, pressure-like, in the lower substernal area / upper epigastric area. Pt states she feels it might be heartburn, but is not certain.   Pt admits VEE and cough unchanged from yesterday. Denies SOB at rest on venturi mask. Denies nausea, vomiting, diarrhea, fever, chills.     MEDICATIONS  (STANDING):  amLODIPine   Tablet 10 milliGRAM(s) Oral daily  aspirin enteric coated 81 milliGRAM(s) Oral daily  benzonatate 100 milliGRAM(s) Oral three times a day  dexAMETHasone     Tablet 6 milliGRAM(s) Oral daily  dextrose 40% Gel 15 Gram(s) Oral once  dextrose 5%. 1000 milliLiter(s) (50 mL/Hr) IV Continuous <Continuous>  dextrose 5%. 1000 milliLiter(s) (100 mL/Hr) IV Continuous <Continuous>  dextrose 50% Injectable 25 Gram(s) IV Push once  dextrose 50% Injectable 12.5 Gram(s) IV Push once  dextrose 50% Injectable 25 Gram(s) IV Push once  enoxaparin Injectable 80 milliGRAM(s) SubCutaneous two times a day  glucagon  Injectable 1 milliGRAM(s) IntraMuscular once  hydrochlorothiazide 25 milliGRAM(s) Oral daily  losartan 100 milliGRAM(s) Oral daily  metoprolol tartrate 50 milliGRAM(s) Oral two times a day  pantoprazole    Tablet 40 milliGRAM(s) Oral before breakfast  simvastatin 20 milliGRAM(s) Oral at bedtime    MEDICATIONS  (PRN):  aluminum hydroxide/magnesium hydroxide/simethicone Suspension 30 milliLiter(s) Oral every 4 hours PRN Dyspepsia  guaiFENesin Oral Liquid (Sugar-Free) 200 milliGRAM(s) Oral every 6 hours PRN Cough          Allergies    No Known Allergies    Intolerances        REVIEW OF SYSTEMS:  CONSTITUTIONAL: No fever or chills  HEENT:  No headache, no sore throat  RESPIRATORY: +cough, no shortness of breath at rest on venturi mask; +VEE  CARDIOVASCULAR: +chest pain (see above), no palpitations  GASTROINTESTINAL: No abd pain, nausea, vomiting, or diarrhea  GENITOURINARY: No dysuria, frequency, or hematuria  NEUROLOGICAL: no focal weakness or dizziness  MUSCULOSKELETAL: no myalgias     Vital Signs Last 24 Hrs  T(C): 36.5 (22 Jan 2022 21:04), Max: 36.8 (22 Jan 2022 12:53)  T(F): 97.7 (22 Jan 2022 21:04), Max: 98.2 (22 Jan 2022 12:53)  HR: 71 (22 Jan 2022 21:04) (71 - 81)  BP: 134/72 (22 Jan 2022 21:04) (134/72 - 146/74)  BP(mean): --  RR: 17 (22 Jan 2022 21:04) (17 - 17)  SpO2: 92% (22 Jan 2022 21:04) (92% - 98%)    PHYSICAL EXAM:  GENERAL: NAD at rest   HEENT:  anicteric, moist mucous membranes  CHEST/LUNG:  coarse breath sounds b/l  HEART:  RRR, S1, S2  ABDOMEN:  BS+, soft, nontender, nondistended  EXTREMITIES: no cyanosis, or calf tenderness  NERVOUS SYSTEM: answers questions and follows commands appropriately    LABS:                                     14.8   12.65 )-----------( 355      ( 22 Jan 2022 08:33 )             43.1     CBC Full  -  ( 22 Jan 2022 08:33 )  WBC Count : 12.65 K/uL  Hemoglobin : 14.8 g/dL  Hematocrit : 43.1 %  Platelet Count - Automated : 355 K/uL  Mean Cell Volume : 86.4 fl  Mean Cell Hemoglobin : 29.7 pg  Mean Cell Hemoglobin Concentration : 34.3 gm/dL  Auto Neutrophil # : 11.23 K/uL  Auto Lymphocyte # : 0.94 K/uL  Auto Monocyte # : 0.39 K/uL  Auto Eosinophil # : 0.00 K/uL  Auto Basophil # : 0.01 K/uL  Auto Neutrophil % : 88.8 %  Auto Lymphocyte % : 7.4 %  Auto Monocyte % : 3.1 %  Auto Eosinophil % : 0.0 %  Auto Basophil % : 0.1 %    01-22    135  |  102  |  27<H>  ----------------------------<  66<L>  3.6   |  26  |  1.00    Ca    9.7      22 Jan 2022 08:33  Mg     2.4     01-22    TPro  7.3  /  Alb  3.0<L>  /  TBili  0.5  /  DBili  x   /  AST  66<H>  /  ALT  89<H>  /  AlkPhos  63  01-22    CARDIAC MARKERS ( 22 Jan 2022 18:01 )  x     / x     / 419 U/L / x     / 4.1 ng/mL    HS troponin: 11.9  lipase: 95          Culture - Urine (collected 01-18-22 @ 00:43)  Source: Clean Catch Clean Catch (Midstream)  Final Report (01-18-22 @ 19:50):    <10,000 CFU/mL Normal Urogenital Mariel    Culture - Blood (collected 01-17-22 @ 18:06)  Source: .Blood Blood-Peripheral  Preliminary Report (01-18-22 @ 19:02):    No growth to date.    Culture - Blood (collected 01-17-22 @ 17:59)  Source: .Blood Blood-Peripheral  Preliminary Report (01-18-22 @ 18:01):    No growth to date.        RADIOLOGY & ADDITIONAL TESTS:    Personally reviewed.     Consultant(s) Notes Reviewed:  [x] YES  [ ] NO

## 2022-01-22 NOTE — PROGRESS NOTE ADULT - TIME BILLING
Note written by attending, see above.  Time spent: 40min. More than 50% of the visit was spent counseling the patient on medical condition - acute hypoxic resp failure due to COVID PNA, venturi mask vs NC, Remdesivir, decadron, chest pain.

## 2022-01-22 NOTE — PROGRESS NOTE ADULT - ASSESSMENT
62yo obese F with PMH of Type 2 DM on insulin, HTN, HLD who presents with 1 week of dry cough, intermittent chest pain induced by coughing, myalgias admitted with acute respiratory failure with hypoxia sec to COVID PNA.     Problem/Plan - 1:  ·  Problem: Acute respiratory failure with hypoxia.   ·  Plan: due to COVID PNA (pt unvaccinated)  maintain sats above 88% with O2 supplemental support.   taper down O2 as able, currently on venturi mask 50% and could not wean down to NC today  CTA did not show PE, but consistent with COVID PNA  cont decadron 6mg daily for 10 days  completed remdesivir x5 days  VTE ppx with full dose lovenox due to high-risk, high d-dimer  pulm (Stevo), recs appreciated     Problem/Plan - 2:  ·  Problem: Chest pain.   ·  Plan: may be MSK from cough or could be reflux   EKG performed and is NSR similar to admission EKG without signs of acute ischemia   will r/out cardiac ischemia with Rolly -- first set showing CK: 419; CKMB: 4.1; HS Troponin: 11.9 -- repeat Rolly in 6 hours  also checked lipase which is normal  given Maalox  monitor closely     Problem/Plan - 3:  ·  Problem: Type 2 diabetes mellitus.   ·  Plan: chronic   will d/c ISS as pt has needed almost zero coverage, and has had some episodes of mild hypoglycemia even without receiving insulin  continue with hypoglycemic protocol  diabetic diet.     Problem/Plan - 4:  ·  Problem: HTN (hypertension).   cont home meds with hold parameters  Amlodipine 10mg po daily   HCTZ 25mg po daily   Metoprolol 50mg po bid      Problem/Plan - 5:  ·  Problem: HLD (hyperlipidemia).   ·  Plan: chronic  Aspirin 81mg daily   Simvastatin 20mg po daily      Problem/Plan - 6:  ·  Problem: Need for prophylactic measure.   ·  Plan: full dose lovenox due to high-risk, high d-dimer  GI ppx with protonix

## 2022-01-22 NOTE — PROGRESS NOTE ADULT - ASSESSMENT
60 yo obese F with pmh Dm2 on insulin. HTN, HLD who presents with 1 week of dry cough, intermittent chest pain induced by coughing, myalgias admitted with acute respiratory failure with hypoxia sec to COVID PNA      ID eval noted  vs noted  labs reviewed  fio2 titration in progress -   on FULL DOSE Lovenox    serial D dimer  isolation for Covid  robitussin - tylenol for sx management  remdesivir - decadron - covid with hypoxemia  ct chest - viral pna  fio2 titration - keep sat > 88 pct  dvt p  cvs rx regimen for HLD - HTN -   DM care - serial FS -   education  counseling  emotional support

## 2022-01-23 LAB
ALBUMIN SERPL ELPH-MCNC: 2.8 G/DL — LOW (ref 3.3–5)
ALBUMIN SERPL ELPH-MCNC: 2.9 G/DL — LOW (ref 3.3–5)
ALP SERPL-CCNC: 55 U/L — SIGNIFICANT CHANGE UP (ref 40–120)
ALP SERPL-CCNC: 55 U/L — SIGNIFICANT CHANGE UP (ref 40–120)
ALT FLD-CCNC: 72 U/L — SIGNIFICANT CHANGE UP (ref 12–78)
ALT FLD-CCNC: 74 U/L — SIGNIFICANT CHANGE UP (ref 12–78)
ANION GAP SERPL CALC-SCNC: 6 MMOL/L — SIGNIFICANT CHANGE UP (ref 5–17)
AST SERPL-CCNC: 53 U/L — HIGH (ref 15–37)
AST SERPL-CCNC: 53 U/L — HIGH (ref 15–37)
BASOPHILS # BLD AUTO: 0.01 K/UL — SIGNIFICANT CHANGE UP (ref 0–0.2)
BASOPHILS NFR BLD AUTO: 0.1 % — SIGNIFICANT CHANGE UP (ref 0–2)
BILIRUB DIRECT SERPL-MCNC: 0.2 MG/DL — SIGNIFICANT CHANGE UP (ref 0–0.3)
BILIRUB INDIRECT FLD-MCNC: 0.4 MG/DL — SIGNIFICANT CHANGE UP (ref 0.2–1)
BILIRUB SERPL-MCNC: 0.6 MG/DL — SIGNIFICANT CHANGE UP (ref 0.2–1.2)
BILIRUB SERPL-MCNC: 0.6 MG/DL — SIGNIFICANT CHANGE UP (ref 0.2–1.2)
BUN SERPL-MCNC: 36 MG/DL — HIGH (ref 7–23)
CALCIUM SERPL-MCNC: 9.9 MG/DL — SIGNIFICANT CHANGE UP (ref 8.5–10.1)
CHLORIDE SERPL-SCNC: 101 MMOL/L — SIGNIFICANT CHANGE UP (ref 96–108)
CK MB BLD-MCNC: 1.1 % — SIGNIFICANT CHANGE UP (ref 0–3.5)
CK MB CFR SERPL CALC: 4.6 NG/ML — HIGH (ref 0–3.6)
CK SERPL-CCNC: 421 U/L — HIGH (ref 26–192)
CO2 SERPL-SCNC: 29 MMOL/L — SIGNIFICANT CHANGE UP (ref 22–31)
CREAT SERPL-MCNC: 1.4 MG/DL — HIGH (ref 0.5–1.3)
CRP SERPL-MCNC: 6 MG/L — HIGH
EOSINOPHIL # BLD AUTO: 0.01 K/UL — SIGNIFICANT CHANGE UP (ref 0–0.5)
EOSINOPHIL NFR BLD AUTO: 0.1 % — SIGNIFICANT CHANGE UP (ref 0–6)
FERRITIN SERPL-MCNC: 1333 NG/ML — HIGH (ref 15–150)
GLUCOSE SERPL-MCNC: 82 MG/DL — SIGNIFICANT CHANGE UP (ref 70–99)
HCT VFR BLD CALC: 39 % — SIGNIFICANT CHANGE UP (ref 34.5–45)
HGB BLD-MCNC: 14.1 G/DL — SIGNIFICANT CHANGE UP (ref 11.5–15.5)
IMM GRANULOCYTES NFR BLD AUTO: 0.7 % — SIGNIFICANT CHANGE UP (ref 0–1.5)
INR BLD: 1.1 RATIO — SIGNIFICANT CHANGE UP (ref 0.88–1.16)
LYMPHOCYTES # BLD AUTO: 0.48 K/UL — LOW (ref 1–3.3)
LYMPHOCYTES # BLD AUTO: 3.9 % — LOW (ref 13–44)
MAGNESIUM SERPL-MCNC: 2.7 MG/DL — HIGH (ref 1.6–2.6)
MCHC RBC-ENTMCNC: 31 PG — SIGNIFICANT CHANGE UP (ref 27–34)
MCHC RBC-ENTMCNC: 36.2 GM/DL — HIGH (ref 32–36)
MCV RBC AUTO: 85.7 FL — SIGNIFICANT CHANGE UP (ref 80–100)
MONOCYTES # BLD AUTO: 0.43 K/UL — SIGNIFICANT CHANGE UP (ref 0–0.9)
MONOCYTES NFR BLD AUTO: 3.5 % — SIGNIFICANT CHANGE UP (ref 2–14)
NEUTROPHILS # BLD AUTO: 11.21 K/UL — HIGH (ref 1.8–7.4)
NEUTROPHILS NFR BLD AUTO: 91.7 % — HIGH (ref 43–77)
NRBC # BLD: 0 /100 WBCS — SIGNIFICANT CHANGE UP (ref 0–0)
PHOSPHATE SERPL-MCNC: 3.2 MG/DL — SIGNIFICANT CHANGE UP (ref 2.5–4.5)
PLATELET # BLD AUTO: 346 K/UL — SIGNIFICANT CHANGE UP (ref 150–400)
POTASSIUM SERPL-MCNC: 4.1 MMOL/L — SIGNIFICANT CHANGE UP (ref 3.5–5.3)
POTASSIUM SERPL-SCNC: 4.1 MMOL/L — SIGNIFICANT CHANGE UP (ref 3.5–5.3)
PROCALCITONIN SERPL-MCNC: 0.12 NG/ML — HIGH (ref 0–0.04)
PROT SERPL-MCNC: 6.8 G/DL — SIGNIFICANT CHANGE UP (ref 6–8.3)
PROT SERPL-MCNC: 6.9 G/DL — SIGNIFICANT CHANGE UP (ref 6–8.3)
PROTHROM AB SERPL-ACNC: 12.8 SEC — SIGNIFICANT CHANGE UP (ref 10.6–13.6)
RBC # BLD: 4.55 M/UL — SIGNIFICANT CHANGE UP (ref 3.8–5.2)
RBC # FLD: 12.6 % — SIGNIFICANT CHANGE UP (ref 10.3–14.5)
SODIUM SERPL-SCNC: 136 MMOL/L — SIGNIFICANT CHANGE UP (ref 135–145)
TROPONIN I, HIGH SENSITIVITY RESULT: 10.6 NG/L — SIGNIFICANT CHANGE UP
WBC # BLD: 12.22 K/UL — HIGH (ref 3.8–10.5)
WBC # FLD AUTO: 12.22 K/UL — HIGH (ref 3.8–10.5)

## 2022-01-23 PROCEDURE — 99233 SBSQ HOSP IP/OBS HIGH 50: CPT

## 2022-01-23 RX ORDER — SODIUM CHLORIDE 9 MG/ML
1000 INJECTION INTRAMUSCULAR; INTRAVENOUS; SUBCUTANEOUS
Refills: 0 | Status: DISCONTINUED | OUTPATIENT
Start: 2022-01-23 | End: 2022-01-24

## 2022-01-23 RX ORDER — INSULIN LISPRO 100/ML
VIAL (ML) SUBCUTANEOUS
Refills: 0 | Status: DISCONTINUED | OUTPATIENT
Start: 2022-01-23 | End: 2022-01-24

## 2022-01-23 RX ORDER — HYDRALAZINE HCL 50 MG
25 TABLET ORAL EVERY 8 HOURS
Refills: 0 | Status: DISCONTINUED | OUTPATIENT
Start: 2022-01-24 | End: 2022-01-25

## 2022-01-23 RX ORDER — INSULIN LISPRO 100/ML
VIAL (ML) SUBCUTANEOUS AT BEDTIME
Refills: 0 | Status: DISCONTINUED | OUTPATIENT
Start: 2022-01-23 | End: 2022-01-23

## 2022-01-23 RX ADMIN — Medication 6 MILLIGRAM(S): at 06:00

## 2022-01-23 RX ADMIN — Medication 81 MILLIGRAM(S): at 12:28

## 2022-01-23 RX ADMIN — Medication 100 MILLIGRAM(S): at 06:00

## 2022-01-23 RX ADMIN — Medication 100 MILLIGRAM(S): at 12:28

## 2022-01-23 RX ADMIN — ENOXAPARIN SODIUM 80 MILLIGRAM(S): 100 INJECTION SUBCUTANEOUS at 06:00

## 2022-01-23 RX ADMIN — Medication 200 MILLIGRAM(S): at 12:28

## 2022-01-23 RX ADMIN — AMLODIPINE BESYLATE 10 MILLIGRAM(S): 2.5 TABLET ORAL at 06:00

## 2022-01-23 RX ADMIN — Medication 100 MILLIGRAM(S): at 21:34

## 2022-01-23 RX ADMIN — Medication 25 MILLIGRAM(S): at 06:00

## 2022-01-23 RX ADMIN — Medication 2: at 22:40

## 2022-01-23 RX ADMIN — Medication 50 MILLIGRAM(S): at 06:00

## 2022-01-23 RX ADMIN — SODIUM CHLORIDE 50 MILLILITER(S): 9 INJECTION INTRAMUSCULAR; INTRAVENOUS; SUBCUTANEOUS at 15:02

## 2022-01-23 RX ADMIN — SODIUM CHLORIDE 50 MILLILITER(S): 9 INJECTION INTRAMUSCULAR; INTRAVENOUS; SUBCUTANEOUS at 21:34

## 2022-01-23 RX ADMIN — ENOXAPARIN SODIUM 80 MILLIGRAM(S): 100 INJECTION SUBCUTANEOUS at 17:11

## 2022-01-23 RX ADMIN — SIMVASTATIN 20 MILLIGRAM(S): 20 TABLET, FILM COATED ORAL at 21:34

## 2022-01-23 RX ADMIN — Medication 50 MILLIGRAM(S): at 17:11

## 2022-01-23 RX ADMIN — PANTOPRAZOLE SODIUM 40 MILLIGRAM(S): 20 TABLET, DELAYED RELEASE ORAL at 06:00

## 2022-01-23 RX ADMIN — LOSARTAN POTASSIUM 100 MILLIGRAM(S): 100 TABLET, FILM COATED ORAL at 06:00

## 2022-01-23 NOTE — PROGRESS NOTE ADULT - SUBJECTIVE AND OBJECTIVE BOX
Date/Time Patient Seen:  		  Referring MD:   Data Reviewed	       Patient is a 61y old  Female who presents with a chief complaint of acute hypoxic resp failure due to COVID PNA (22 Jan 2022 21:13)      Subjective/HPI     PAST MEDICAL & SURGICAL HISTORY:  HTN (hypertension)  resistant type,, &gt;3 bp meds    DM (diabetes mellitus)  type 2-insulin dependenct    No significant past surgical history          Medication list         MEDICATIONS  (STANDING):  amLODIPine   Tablet 10 milliGRAM(s) Oral daily  aspirin enteric coated 81 milliGRAM(s) Oral daily  benzonatate 100 milliGRAM(s) Oral three times a day  dexAMETHasone     Tablet 6 milliGRAM(s) Oral daily  dextrose 40% Gel 15 Gram(s) Oral once  dextrose 5%. 1000 milliLiter(s) (50 mL/Hr) IV Continuous <Continuous>  dextrose 5%. 1000 milliLiter(s) (100 mL/Hr) IV Continuous <Continuous>  dextrose 50% Injectable 25 Gram(s) IV Push once  dextrose 50% Injectable 12.5 Gram(s) IV Push once  dextrose 50% Injectable 25 Gram(s) IV Push once  enoxaparin Injectable 80 milliGRAM(s) SubCutaneous two times a day  glucagon  Injectable 1 milliGRAM(s) IntraMuscular once  hydrochlorothiazide 25 milliGRAM(s) Oral daily  losartan 100 milliGRAM(s) Oral daily  metoprolol tartrate 50 milliGRAM(s) Oral two times a day  pantoprazole    Tablet 40 milliGRAM(s) Oral before breakfast  simvastatin 20 milliGRAM(s) Oral at bedtime    MEDICATIONS  (PRN):  aluminum hydroxide/magnesium hydroxide/simethicone Suspension 30 milliLiter(s) Oral every 4 hours PRN Dyspepsia  guaiFENesin Oral Liquid (Sugar-Free) 200 milliGRAM(s) Oral every 6 hours PRN Cough         Vitals log        ICU Vital Signs Last 24 Hrs  T(C): 36.7 (23 Jan 2022 05:10), Max: 36.8 (22 Jan 2022 12:53)  T(F): 98 (23 Jan 2022 05:10), Max: 98.2 (22 Jan 2022 12:53)  HR: 75 (23 Jan 2022 05:10) (71 - 75)  BP: 147/82 (23 Jan 2022 05:10) (134/72 - 147/82)  BP(mean): --  ABP: --  ABP(mean): --  RR: 17 (23 Jan 2022 05:10) (17 - 17)  SpO2: 92% (23 Jan 2022 05:10) (92% - 95%)           Input and Output:  I&O's Detail      Lab Data                        14.8   12.65 )-----------( 355      ( 22 Jan 2022 08:33 )             43.1     01-22    135  |  102  |  27<H>  ----------------------------<  66<L>  3.6   |  26  |  1.00    Ca    9.7      22 Jan 2022 08:33  Mg     2.4     01-22    TPro  7.3  /  Alb  3.0<L>  /  TBili  0.5  /  DBili  x   /  AST  66<H>  /  ALT  89<H>  /  AlkPhos  63  01-22      CARDIAC MARKERS ( 22 Jan 2022 23:47 )  x     / x     / 421 U/L / x     / 4.6 ng/mL  CARDIAC MARKERS ( 22 Jan 2022 18:01 )  x     / x     / 419 U/L / x     / 4.1 ng/mL        Review of Systems	      Objective     Physical Examination    heart s1s2  lung dec BS  abd soft  on VM    Pertinent Lab findings & Imaging      Frank:  NO   Adequate UO     I&O's Detail           Discussed with:     Cultures:	        Radiology

## 2022-01-23 NOTE — PROGRESS NOTE ADULT - SUBJECTIVE AND OBJECTIVE BOX
Patient is a 61y old  Female who presents with a chief complaint of cough, SOB.        INTERVAL HPI/OVERNIGHT EVENTS: Pt states mild chest pain last night improved after Maalox. Pt admits VEE and cough (gradually improving). Denies SOB at rest on venturi mask. Denies nausea, vomiting, diarrhea, fever, chills.     MEDICATIONS  (STANDING):  amLODIPine   Tablet 10 milliGRAM(s) Oral daily  aspirin enteric coated 81 milliGRAM(s) Oral daily  benzonatate 100 milliGRAM(s) Oral three times a day  dexAMETHasone     Tablet 6 milliGRAM(s) Oral daily  dextrose 40% Gel 15 Gram(s) Oral once  dextrose 5%. 1000 milliLiter(s) (50 mL/Hr) IV Continuous <Continuous>  dextrose 5%. 1000 milliLiter(s) (100 mL/Hr) IV Continuous <Continuous>  dextrose 50% Injectable 25 Gram(s) IV Push once  dextrose 50% Injectable 12.5 Gram(s) IV Push once  dextrose 50% Injectable 25 Gram(s) IV Push once  enoxaparin Injectable 80 milliGRAM(s) SubCutaneous two times a day  glucagon  Injectable 1 milliGRAM(s) IntraMuscular once  hydrochlorothiazide 25 milliGRAM(s) Oral daily  losartan 100 milliGRAM(s) Oral daily  metoprolol tartrate 50 milliGRAM(s) Oral two times a day  pantoprazole    Tablet 40 milliGRAM(s) Oral before breakfast  simvastatin 20 milliGRAM(s) Oral at bedtime    MEDICATIONS  (PRN):  aluminum hydroxide/magnesium hydroxide/simethicone Suspension 30 milliLiter(s) Oral every 4 hours PRN Dyspepsia  guaiFENesin Oral Liquid (Sugar-Free) 200 milliGRAM(s) Oral every 6 hours PRN Cough          Allergies    No Known Allergies    Intolerances        REVIEW OF SYSTEMS:  CONSTITUTIONAL: No fever or chills  HEENT:  No headache, no sore throat  RESPIRATORY: +cough, no shortness of breath at rest on venturi mask; +VEE  CARDIOVASCULAR: no chest pain or palpitations  GASTROINTESTINAL: No abd pain, nausea, vomiting, or diarrhea  GENITOURINARY: No dysuria, frequency, or hematuria  NEUROLOGICAL: no focal weakness or dizziness  MUSCULOSKELETAL: no myalgias     Vital Signs Last 24 Hrs  T(C): 36.7 (23 Jan 2022 05:10), Max: 36.8 (22 Jan 2022 12:53)  T(F): 98 (23 Jan 2022 05:10), Max: 98.2 (22 Jan 2022 12:53)  HR: 75 (23 Jan 2022 05:10) (71 - 75)  BP: 147/82 (23 Jan 2022 05:10) (134/72 - 147/82)  BP(mean): --  RR: 17 (23 Jan 2022 05:10) (17 - 17)  SpO2: 92% (23 Jan 2022 05:10) (92% - 95%)    PHYSICAL EXAM:  GENERAL: NAD at rest   HEENT:  anicteric, moist mucous membranes  CHEST/LUNG:  coarse breath sounds b/l  HEART:  RRR, S1, S2  ABDOMEN:  BS+, soft, nontender, nondistended  EXTREMITIES: no cyanosis, or calf tenderness  NERVOUS SYSTEM: answers questions and follows commands appropriately    LABS:                                                14.1   12.22 )-----------( 346      ( 23 Jan 2022 09:21 )             39.0     CBC Full  -  ( 23 Jan 2022 09:21 )  WBC Count : 12.22 K/uL  Hemoglobin : 14.1 g/dL  Hematocrit : 39.0 %  Platelet Count - Automated : 346 K/uL  Mean Cell Volume : 85.7 fl  Mean Cell Hemoglobin : 31.0 pg  Mean Cell Hemoglobin Concentration : 36.2 gm/dL  Auto Neutrophil # : 11.21 K/uL  Auto Lymphocyte # : 0.48 K/uL  Auto Monocyte # : 0.43 K/uL  Auto Eosinophil # : 0.01 K/uL  Auto Basophil # : 0.01 K/uL  Auto Neutrophil % : 91.7 %  Auto Lymphocyte % : 3.9 %  Auto Monocyte % : 3.5 %  Auto Eosinophil % : 0.1 %  Auto Basophil % : 0.1 %    01-23    136  |  101  |  36<H>  ----------------------------<  82  4.1   |  29  |  1.40<H>    Ca    9.9      23 Jan 2022 09:21  Phos  3.2     01-23  Mg     2.7     01-23    TPro  6.9  /  Alb  2.8<L>  /  TBili  0.6  /  DBili  0.2  /  AST  53<H>  /  ALT  72  /  AlkPhos  55  01-23    CARDIAC MARKERS ( 22 Jan 2022 23:47 )  x     / x     / 421 U/L / x     / 4.6 ng/mL  CARDIAC MARKERS ( 22 Jan 2022 18:01 )  x     / x     / 419 U/L / x     / 4.1 ng/mL      HS troponin: 11.9 --> 10.6  lipase: 95          Culture - Urine (collected 01-18-22 @ 00:43)  Source: Clean Catch Clean Catch (Midstream)  Final Report (01-18-22 @ 19:50):    <10,000 CFU/mL Normal Urogenital Mariel    Culture - Blood (collected 01-17-22 @ 18:06)  Source: .Blood Blood-Peripheral  Preliminary Report (01-18-22 @ 19:02):    No growth to date.    Culture - Blood (collected 01-17-22 @ 17:59)  Source: .Blood Blood-Peripheral  Preliminary Report (01-18-22 @ 18:01):    No growth to date.        RADIOLOGY & ADDITIONAL TESTS:    Personally reviewed.     Consultant(s) Notes Reviewed:  [x] YES  [ ] NO     Patient is a 61y old  Female who presents with a chief complaint of cough, SOB.        INTERVAL HPI/OVERNIGHT EVENTS: Pt states mild chest pain last night improved after Maalox. Pt admits VEE and cough (gradually improving). Denies SOB at rest on venturi mask. Denies nausea, vomiting, diarrhea, fever, chills.     MEDICATIONS  (STANDING):  amLODIPine   Tablet 10 milliGRAM(s) Oral daily  aspirin enteric coated 81 milliGRAM(s) Oral daily  benzonatate 100 milliGRAM(s) Oral three times a day  dexAMETHasone     Tablet 6 milliGRAM(s) Oral daily  dextrose 40% Gel 15 Gram(s) Oral once  dextrose 5%. 1000 milliLiter(s) (50 mL/Hr) IV Continuous <Continuous>  dextrose 5%. 1000 milliLiter(s) (100 mL/Hr) IV Continuous <Continuous>  dextrose 50% Injectable 25 Gram(s) IV Push once  dextrose 50% Injectable 12.5 Gram(s) IV Push once  dextrose 50% Injectable 25 Gram(s) IV Push once  enoxaparin Injectable 80 milliGRAM(s) SubCutaneous two times a day  glucagon  Injectable 1 milliGRAM(s) IntraMuscular once  hydrALAZINE 25 milliGRAM(s) Oral every 8 hours  metoprolol tartrate 50 milliGRAM(s) Oral two times a day  pantoprazole    Tablet 40 milliGRAM(s) Oral before breakfast  simvastatin 20 milliGRAM(s) Oral at bedtime  sodium chloride 0.9%. 1000 milliLiter(s) (50 mL/Hr) IV Continuous <Continuous>    MEDICATIONS  (PRN):  aluminum hydroxide/magnesium hydroxide/simethicone Suspension 30 milliLiter(s) Oral every 4 hours PRN Dyspepsia  guaiFENesin Oral Liquid (Sugar-Free) 200 milliGRAM(s) Oral every 6 hours PRN Cough          Allergies    No Known Allergies    Intolerances        REVIEW OF SYSTEMS:  CONSTITUTIONAL: No fever or chills  HEENT:  No headache, no sore throat  RESPIRATORY: +cough, no shortness of breath at rest on venturi mask; +VEE  CARDIOVASCULAR: no chest pain or palpitations  GASTROINTESTINAL: No abd pain, nausea, vomiting, or diarrhea  GENITOURINARY: No dysuria, frequency, or hematuria  NEUROLOGICAL: no focal weakness or dizziness  MUSCULOSKELETAL: no myalgias     Vital Signs Last 24 Hrs  T(C): 36.7 (23 Jan 2022 05:10), Max: 36.8 (22 Jan 2022 12:53)  T(F): 98 (23 Jan 2022 05:10), Max: 98.2 (22 Jan 2022 12:53)  HR: 75 (23 Jan 2022 05:10) (71 - 75)  BP: 147/82 (23 Jan 2022 05:10) (134/72 - 147/82)  BP(mean): --  RR: 17 (23 Jan 2022 05:10) (17 - 17)  SpO2: 92% (23 Jan 2022 05:10) (92% - 95%)    PHYSICAL EXAM:  GENERAL: NAD at rest   HEENT:  anicteric, moist mucous membranes  CHEST/LUNG:  coarse breath sounds b/l  HEART:  RRR, S1, S2  ABDOMEN:  BS+, soft, nontender, nondistended  EXTREMITIES: no cyanosis, or calf tenderness  NERVOUS SYSTEM: answers questions and follows commands appropriately    LABS:                                                14.1   12.22 )-----------( 346      ( 23 Jan 2022 09:21 )             39.0     CBC Full  -  ( 23 Jan 2022 09:21 )  WBC Count : 12.22 K/uL  Hemoglobin : 14.1 g/dL  Hematocrit : 39.0 %  Platelet Count - Automated : 346 K/uL  Mean Cell Volume : 85.7 fl  Mean Cell Hemoglobin : 31.0 pg  Mean Cell Hemoglobin Concentration : 36.2 gm/dL  Auto Neutrophil # : 11.21 K/uL  Auto Lymphocyte # : 0.48 K/uL  Auto Monocyte # : 0.43 K/uL  Auto Eosinophil # : 0.01 K/uL  Auto Basophil # : 0.01 K/uL  Auto Neutrophil % : 91.7 %  Auto Lymphocyte % : 3.9 %  Auto Monocyte % : 3.5 %  Auto Eosinophil % : 0.1 %  Auto Basophil % : 0.1 %    01-23    136  |  101  |  36<H>  ----------------------------<  82  4.1   |  29  |  1.40<H>    Ca    9.9      23 Jan 2022 09:21  Phos  3.2     01-23  Mg     2.7     01-23    TPro  6.9  /  Alb  2.8<L>  /  TBili  0.6  /  DBili  0.2  /  AST  53<H>  /  ALT  72  /  AlkPhos  55  01-23    CARDIAC MARKERS ( 22 Jan 2022 23:47 )  x     / x     / 421 U/L / x     / 4.6 ng/mL  CARDIAC MARKERS ( 22 Jan 2022 18:01 )  x     / x     / 419 U/L / x     / 4.1 ng/mL      HS troponin: 11.9 --> 10.6  lipase: 95          Culture - Urine (collected 01-18-22 @ 00:43)  Source: Clean Catch Clean Catch (Midstream)  Final Report (01-18-22 @ 19:50):    <10,000 CFU/mL Normal Urogenital Mariel    Culture - Blood (collected 01-17-22 @ 18:06)  Source: .Blood Blood-Peripheral  Preliminary Report (01-18-22 @ 19:02):    No growth to date.    Culture - Blood (collected 01-17-22 @ 17:59)  Source: .Blood Blood-Peripheral  Preliminary Report (01-18-22 @ 18:01):    No growth to date.        RADIOLOGY & ADDITIONAL TESTS:    Personally reviewed.     Consultant(s) Notes Reviewed:  [x] YES  [ ] NO

## 2022-01-23 NOTE — PROGRESS NOTE ADULT - TIME BILLING
Note written by attending, see above.  Time spent: 40min. More than 50% of the visit was spent counseling the patient on medical condition - acute hypoxic resp failure due to COVID PNA, venturi mask vs NC, Remdesivir, decadron, chest pain. Note written by attending, see above.  Time spent: 40min. More than 50% of the visit was spent counseling the patient on medical condition - acute hypoxic resp failure due to COVID PNA, venturi mask vs NC, Remdesivir, decadron, chest pain, JING.

## 2022-01-23 NOTE — PROGRESS NOTE ADULT - ASSESSMENT
60yo obese F with PMH of Type 2 DM on insulin, HTN, HLD who presents with 1 week of dry cough, intermittent chest pain induced by coughing, myalgias admitted with acute respiratory failure with hypoxia sec to COVID PNA.    #Acute respiratory failure with hypoxia.   due to COVID PNA (pt unvaccinated)  maintain sats above 88% with O2 supplemental support.   taper down O2 as able, currently on venturi mask 50% and will retry to wean down to NC again this afternoon  CTA did not show PE, but consistent with COVID PNA  cont decadron 6mg daily (day 7/10)  completed remdesivir x5 days  VTE ppx with full dose lovenox due to high-risk, high d-dimer  recheck d-dimer in AM  pulm (Stevo), recs appreciated    #JING.   likely multifactorial - might be somewhat pre-renal with contribution to JING from HCTZ and losartan  hold HCTZ and losartan  nephro consulted ([facility]), recs appreciated - nephro considering gentle IV hydration for 1 day    #Chest pain.   may be MSK from cough or could be reflux   EKG performed and is NSR similar to admission EKG without signs of acute ischemia   will r/out cardiac ischemia with Rolly -- first set showing CK: 419; CKMB: 4.1; HS Troponin: 11.9 -- repeat Rolly in 6 hours were the same with very low HStroponin - ACS ruled out  also checked lipase which was normal  given Maalox and had improvement, so was likely acid reflux  monitor closely    #Type 2 diabetes mellitus.   chronic   discontinued ISS as pt has needed almost zero coverage, and has had some episodes of mild hypoglycemia even without receiving insulin  continue with hypoglycemic protocol  diabetic diet.    #HTN (hypertension).   cont home meds with hold parameters  Amlodipine 10mg po daily   hold HCTZ and losartan due to JING (of note, pt already received the two drugs at 6AM this morning before labs were back)  will start hydralazine 25mg po TID tomorrow in place of those two anti-hypertensives  Metoprolol 50mg po BID    #HLD (hyperlipidemia).   chronic  Aspirin 81mg daily   Simvastatin 20mg po daily     #Need for prophylactic measure.   full dose lovenox due to high-risk, high d-dimer  GI ppx with protonix           62yo obese F with PMH of Type 2 DM on insulin, HTN, HLD who presents with 1 week of dry cough, intermittent chest pain induced by coughing, myalgias admitted with acute respiratory failure with hypoxia sec to COVID PNA.    #Acute respiratory failure with hypoxia.   due to COVID PNA (pt unvaccinated)  maintain sats above 88% with O2 supplemental support.   taper down O2 as able, currently on venturi mask 50% and will retry to wean down to NC again this afternoon  CTA did not show PE, but consistent with COVID PNA  cont decadron 6mg daily (day 7/10)  completed remdesivir x5 days  VTE ppx with full dose lovenox due to high-risk, high d-dimer  recheck d-dimer in AM  pulm (Stevo), recs appreciated    #JING.   Cr up to 1.4 today from ~0.8 a few days ago  likely multifactorial - might be somewhat pre-renal with contribution to JING from HCTZ and losartan  hold HCTZ and losartan  nephro consulted ([facility]), recs appreciated - nephro ordered gentle IV hydration with NS at 50cc/hr for 1 day    #Chest pain.   may be MSK from cough or could be reflux   EKG performed and is NSR similar to admission EKG without signs of acute ischemia   will r/out cardiac ischemia with Rolly -- first set showing CK: 419; CKMB: 4.1; HS Troponin: 11.9 -- repeat Rolly in 6 hours were the same with very low HStroponin - ACS ruled out  also checked lipase which was normal  given Maalox and had improvement, so was likely acid reflux  monitor closely    #Type 2 diabetes mellitus.   chronic   discontinued ISS as pt has needed almost zero coverage, and has had some episodes of mild hypoglycemia even without receiving insulin  continue with hypoglycemic protocol  diabetic diet.    #HTN (hypertension).   cont home meds with hold parameters  Amlodipine 10mg po daily   hold HCTZ and losartan due to JING (of note, pt already received the two drugs at 6AM this morning before labs were back)  will start hydralazine 25mg po TID tomorrow in place of those two anti-hypertensives  Metoprolol 50mg po BID    #HLD (hyperlipidemia).   chronic  Aspirin 81mg daily   Simvastatin 20mg po daily     #Need for prophylactic measure.   full dose lovenox due to high-risk, high d-dimer  GI ppx with protonix

## 2022-01-24 LAB
ALBUMIN SERPL ELPH-MCNC: 2.8 G/DL — LOW (ref 3.3–5)
ALBUMIN SERPL ELPH-MCNC: 2.8 G/DL — LOW (ref 3.3–5)
ALP SERPL-CCNC: 57 U/L — SIGNIFICANT CHANGE UP (ref 40–120)
ALP SERPL-CCNC: 59 U/L — SIGNIFICANT CHANGE UP (ref 40–120)
ALT FLD-CCNC: 69 U/L — SIGNIFICANT CHANGE UP (ref 12–78)
ALT FLD-CCNC: 70 U/L — SIGNIFICANT CHANGE UP (ref 12–78)
ANION GAP SERPL CALC-SCNC: 7 MMOL/L — SIGNIFICANT CHANGE UP (ref 5–17)
APPEARANCE UR: CLEAR — SIGNIFICANT CHANGE UP
AST SERPL-CCNC: 45 U/L — HIGH (ref 15–37)
AST SERPL-CCNC: 49 U/L — HIGH (ref 15–37)
BASOPHILS # BLD AUTO: 0 K/UL — SIGNIFICANT CHANGE UP (ref 0–0.2)
BASOPHILS NFR BLD AUTO: 0 % — SIGNIFICANT CHANGE UP (ref 0–2)
BILIRUB DIRECT SERPL-MCNC: 0.1 MG/DL — SIGNIFICANT CHANGE UP (ref 0–0.3)
BILIRUB INDIRECT FLD-MCNC: 0.6 MG/DL — SIGNIFICANT CHANGE UP (ref 0.2–1)
BILIRUB SERPL-MCNC: 0.6 MG/DL — SIGNIFICANT CHANGE UP (ref 0.2–1.2)
BILIRUB SERPL-MCNC: 0.7 MG/DL — SIGNIFICANT CHANGE UP (ref 0.2–1.2)
BILIRUB UR-MCNC: NEGATIVE — SIGNIFICANT CHANGE UP
BUN SERPL-MCNC: 38 MG/DL — HIGH (ref 7–23)
CALCIUM SERPL-MCNC: 9.7 MG/DL — SIGNIFICANT CHANGE UP (ref 8.5–10.1)
CHLORIDE SERPL-SCNC: 104 MMOL/L — SIGNIFICANT CHANGE UP (ref 96–108)
CK SERPL-CCNC: 365 U/L — HIGH (ref 26–192)
CO2 SERPL-SCNC: 26 MMOL/L — SIGNIFICANT CHANGE UP (ref 22–31)
COLOR SPEC: SIGNIFICANT CHANGE UP
CREAT ?TM UR-MCNC: 102 MG/DL — SIGNIFICANT CHANGE UP
CREAT SERPL-MCNC: 1.2 MG/DL — SIGNIFICANT CHANGE UP (ref 0.5–1.3)
D DIMER BLD IA.RAPID-MCNC: 228 NG/ML DDU — SIGNIFICANT CHANGE UP
DIFF PNL FLD: NEGATIVE — SIGNIFICANT CHANGE UP
EOSINOPHIL # BLD AUTO: 0 K/UL — SIGNIFICANT CHANGE UP (ref 0–0.5)
EOSINOPHIL NFR BLD AUTO: 0 % — SIGNIFICANT CHANGE UP (ref 0–6)
EPI CELLS # UR: SIGNIFICANT CHANGE UP
GLUCOSE SERPL-MCNC: 96 MG/DL — SIGNIFICANT CHANGE UP (ref 70–99)
GLUCOSE UR QL: NEGATIVE — SIGNIFICANT CHANGE UP
HCT VFR BLD CALC: 39.8 % — SIGNIFICANT CHANGE UP (ref 34.5–45)
HGB BLD-MCNC: 13.8 G/DL — SIGNIFICANT CHANGE UP (ref 11.5–15.5)
IMM GRANULOCYTES NFR BLD AUTO: 0.4 % — SIGNIFICANT CHANGE UP (ref 0–1.5)
INR BLD: 1.07 RATIO — SIGNIFICANT CHANGE UP (ref 0.88–1.16)
KETONES UR-MCNC: NEGATIVE — SIGNIFICANT CHANGE UP
LEUKOCYTE ESTERASE UR-ACNC: NEGATIVE — SIGNIFICANT CHANGE UP
LYMPHOCYTES # BLD AUTO: 0.54 K/UL — LOW (ref 1–3.3)
LYMPHOCYTES # BLD AUTO: 5.9 % — LOW (ref 13–44)
MCHC RBC-ENTMCNC: 30.1 PG — SIGNIFICANT CHANGE UP (ref 27–34)
MCHC RBC-ENTMCNC: 34.7 GM/DL — SIGNIFICANT CHANGE UP (ref 32–36)
MCV RBC AUTO: 86.9 FL — SIGNIFICANT CHANGE UP (ref 80–100)
MONOCYTES # BLD AUTO: 0.29 K/UL — SIGNIFICANT CHANGE UP (ref 0–0.9)
MONOCYTES NFR BLD AUTO: 3.2 % — SIGNIFICANT CHANGE UP (ref 2–14)
NEUTROPHILS # BLD AUTO: 8.33 K/UL — HIGH (ref 1.8–7.4)
NEUTROPHILS NFR BLD AUTO: 90.5 % — HIGH (ref 43–77)
NITRITE UR-MCNC: NEGATIVE — SIGNIFICANT CHANGE UP
NRBC # BLD: 0 /100 WBCS — SIGNIFICANT CHANGE UP (ref 0–0)
PH UR: 6 — SIGNIFICANT CHANGE UP (ref 5–8)
PLATELET # BLD AUTO: 322 K/UL — SIGNIFICANT CHANGE UP (ref 150–400)
POTASSIUM SERPL-MCNC: 4.4 MMOL/L — SIGNIFICANT CHANGE UP (ref 3.5–5.3)
POTASSIUM SERPL-SCNC: 4.4 MMOL/L — SIGNIFICANT CHANGE UP (ref 3.5–5.3)
PROT SERPL-MCNC: 6.9 G/DL — SIGNIFICANT CHANGE UP (ref 6–8.3)
PROT SERPL-MCNC: 6.9 G/DL — SIGNIFICANT CHANGE UP (ref 6–8.3)
PROT UR-MCNC: 15
PROTHROM AB SERPL-ACNC: 12.5 SEC — SIGNIFICANT CHANGE UP (ref 10.6–13.6)
RBC # BLD: 4.58 M/UL — SIGNIFICANT CHANGE UP (ref 3.8–5.2)
RBC # FLD: 12.5 % — SIGNIFICANT CHANGE UP (ref 10.3–14.5)
RBC CASTS # UR COMP ASSIST: SIGNIFICANT CHANGE UP /HPF (ref 0–4)
SODIUM SERPL-SCNC: 137 MMOL/L — SIGNIFICANT CHANGE UP (ref 135–145)
SODIUM UR-SCNC: 47 MMOL/L — SIGNIFICANT CHANGE UP
SP GR SPEC: 1.01 — SIGNIFICANT CHANGE UP (ref 1.01–1.02)
UROBILINOGEN FLD QL: NEGATIVE — SIGNIFICANT CHANGE UP
WBC # BLD: 9.2 K/UL — SIGNIFICANT CHANGE UP (ref 3.8–10.5)
WBC # FLD AUTO: 9.2 K/UL — SIGNIFICANT CHANGE UP (ref 3.8–10.5)
WBC UR QL: SIGNIFICANT CHANGE UP

## 2022-01-24 PROCEDURE — 99233 SBSQ HOSP IP/OBS HIGH 50: CPT

## 2022-01-24 RX ORDER — INSULIN LISPRO 100/ML
VIAL (ML) SUBCUTANEOUS
Refills: 0 | Status: DISCONTINUED | OUTPATIENT
Start: 2022-01-24 | End: 2022-01-27

## 2022-01-24 RX ORDER — ENOXAPARIN SODIUM 100 MG/ML
40 INJECTION SUBCUTANEOUS
Refills: 0 | Status: DISCONTINUED | OUTPATIENT
Start: 2022-01-24 | End: 2022-01-27

## 2022-01-24 RX ADMIN — ENOXAPARIN SODIUM 40 MILLIGRAM(S): 100 INJECTION SUBCUTANEOUS at 17:18

## 2022-01-24 RX ADMIN — Medication 50 MILLIGRAM(S): at 06:34

## 2022-01-24 RX ADMIN — Medication 100 MILLIGRAM(S): at 13:48

## 2022-01-24 RX ADMIN — ENOXAPARIN SODIUM 80 MILLIGRAM(S): 100 INJECTION SUBCUTANEOUS at 06:34

## 2022-01-24 RX ADMIN — Medication 4: at 11:44

## 2022-01-24 RX ADMIN — Medication 25 MILLIGRAM(S): at 06:34

## 2022-01-24 RX ADMIN — SODIUM CHLORIDE 50 MILLILITER(S): 9 INJECTION INTRAMUSCULAR; INTRAVENOUS; SUBCUTANEOUS at 11:23

## 2022-01-24 RX ADMIN — Medication 50 MILLIGRAM(S): at 17:19

## 2022-01-24 RX ADMIN — AMLODIPINE BESYLATE 10 MILLIGRAM(S): 2.5 TABLET ORAL at 06:34

## 2022-01-24 RX ADMIN — Medication 25 MILLIGRAM(S): at 21:46

## 2022-01-24 RX ADMIN — Medication 100 MILLIGRAM(S): at 06:34

## 2022-01-24 RX ADMIN — PANTOPRAZOLE SODIUM 40 MILLIGRAM(S): 20 TABLET, DELAYED RELEASE ORAL at 06:34

## 2022-01-24 RX ADMIN — Medication 6 MILLIGRAM(S): at 06:34

## 2022-01-24 RX ADMIN — SIMVASTATIN 20 MILLIGRAM(S): 20 TABLET, FILM COATED ORAL at 21:46

## 2022-01-24 RX ADMIN — Medication 25 MILLIGRAM(S): at 13:48

## 2022-01-24 RX ADMIN — Medication 81 MILLIGRAM(S): at 11:23

## 2022-01-24 RX ADMIN — Medication 100 MILLIGRAM(S): at 21:46

## 2022-01-24 NOTE — PROGRESS NOTE ADULT - SUBJECTIVE AND OBJECTIVE BOX
Date/Time Patient Seen:  		  Referring MD:   Data Reviewed	       Patient is a 61y old  Female who presents with a chief complaint of cough, covid exposure (23 Jan 2022 12:37)      Subjective/HPI     PAST MEDICAL & SURGICAL HISTORY:  HTN (hypertension)  resistant type,, &gt;3 bp meds    DM (diabetes mellitus)  type 2-insulin dependenct    No significant past surgical history          Medication list         MEDICATIONS  (STANDING):  amLODIPine   Tablet 10 milliGRAM(s) Oral daily  aspirin enteric coated 81 milliGRAM(s) Oral daily  benzonatate 100 milliGRAM(s) Oral three times a day  dexAMETHasone     Tablet 6 milliGRAM(s) Oral daily  dextrose 40% Gel 15 Gram(s) Oral once  dextrose 5%. 1000 milliLiter(s) (50 mL/Hr) IV Continuous <Continuous>  dextrose 5%. 1000 milliLiter(s) (100 mL/Hr) IV Continuous <Continuous>  dextrose 50% Injectable 25 Gram(s) IV Push once  dextrose 50% Injectable 12.5 Gram(s) IV Push once  dextrose 50% Injectable 25 Gram(s) IV Push once  enoxaparin Injectable 80 milliGRAM(s) SubCutaneous two times a day  glucagon  Injectable 1 milliGRAM(s) IntraMuscular once  hydrALAZINE 25 milliGRAM(s) Oral every 8 hours  insulin lispro (ADMELOG) corrective regimen sliding scale   SubCutaneous three times a day before meals  metoprolol tartrate 50 milliGRAM(s) Oral two times a day  pantoprazole    Tablet 40 milliGRAM(s) Oral before breakfast  simvastatin 20 milliGRAM(s) Oral at bedtime  sodium chloride 0.9%. 1000 milliLiter(s) (50 mL/Hr) IV Continuous <Continuous>    MEDICATIONS  (PRN):  aluminum hydroxide/magnesium hydroxide/simethicone Suspension 30 milliLiter(s) Oral every 4 hours PRN Dyspepsia  guaiFENesin Oral Liquid (Sugar-Free) 200 milliGRAM(s) Oral every 6 hours PRN Cough         Vitals log        ICU Vital Signs Last 24 Hrs  T(C): 36.6 (23 Jan 2022 21:40), Max: 36.8 (23 Jan 2022 13:16)  T(F): 97.9 (23 Jan 2022 21:40), Max: 98.3 (23 Jan 2022 13:16)  HR: 69 (23 Jan 2022 21:40) (69 - 71)  BP: 123/71 (23 Jan 2022 21:40) (123/71 - 128/74)  BP(mean): --  ABP: --  ABP(mean): --  RR: 18 (23 Jan 2022 21:40) (17 - 18)  SpO2: 95% (23 Jan 2022 21:40) (92% - 95%)           Input and Output:  I&O's Detail    23 Jan 2022 07:01  -  24 Jan 2022 05:29  --------------------------------------------------------  IN:    sodium chloride 0.9%: 150 mL  Total IN: 150 mL    OUT:  Total OUT: 0 mL    Total NET: 150 mL          Lab Data                        14.1   12.22 )-----------( 346      ( 23 Jan 2022 09:21 )             39.0     01-23    136  |  101  |  36<H>  ----------------------------<  82  4.1   |  29  |  1.40<H>    Ca    9.9      23 Jan 2022 09:21  Phos  3.2     01-23  Mg     2.7     01-23    TPro  6.9  /  Alb  2.8<L>  /  TBili  0.6  /  DBili  0.2  /  AST  53<H>  /  ALT  72  /  AlkPhos  55  01-23      CARDIAC MARKERS ( 22 Jan 2022 23:47 )  x     / x     / 421 U/L / x     / 4.6 ng/mL  CARDIAC MARKERS ( 22 Jan 2022 18:01 )  x     / x     / 419 U/L / x     / 4.1 ng/mL        Review of Systems	      Objective     Physical Examination    heart s1s2  lung dc BS  abd soft      Pertinent Lab findings & Imaging      Frank:  NO   Adequate UO     I&O's Detail    23 Jan 2022 07:01  -  24 Jan 2022 05:29  --------------------------------------------------------  IN:    sodium chloride 0.9%: 150 mL  Total IN: 150 mL    OUT:  Total OUT: 0 mL    Total NET: 150 mL               Discussed with:     Cultures:	        Radiology

## 2022-01-24 NOTE — PROGRESS NOTE ADULT - ASSESSMENT
62yo obese F with PMH of Type 2 DM on insulin, HTN, HLD who presents with 1 week of dry cough, intermittent chest pain induced by coughing, myalgias admitted with acute respiratory failure with hypoxia sec to COVID PNA.    #Acute respiratory failure with hypoxia.   due to COVID PNA (pt unvaccinated)  maintain sats above 88% with O2 supplemental support.   taper down O2 as able, currently on 6L NC and saturating in the high-80s to low 90s -- will retry to wean down flow as able   CTA did not show PE, but consistent with COVID PNA  cont decadron 6mg daily (day 8/10)  completed remdesivir x5 days  VTE ppx with full dose lovenox due to high-risk, high d-dimer  recheck d-dimer in AM  pulm (Stevo), recs appreciated    #JING.   Cr up to 1.4 on 1/23 from ~0.8 a couple of days prior ; now improving 1.2 today  likely multifactorial - might be somewhat pre-renal with contribution to JING from HCTZ and losartan  hold HCTZ and losartan  nephro consulted ([facility] group), recs appreciated - nephro ordered gentle IV hydration with NS at 50cc/hr for 1 day    #Chest pain.   may be MSK from cough or could be reflux   EKG performed and was NSR similar to admission EKG without signs of acute ischemia   r/out cardiac ischemia with Rolly -- first set showing CK: 419; CKMB: 4.1; HS Troponin: 11.9 -- repeat Rolly in 6 hours were the same with very low HStroponin - ACS ruled out  also checked lipase which was normal  given Maalox and had improvement, so was likely acid reflux  monitor closely    #Type 2 diabetes mellitus.   chronic   low dose lispro ISS   continue with hypoglycemic protocol  diabetic diet.    #HTN (hypertension).   cont home meds with hold parameters  Amlodipine 10mg po daily   hold HCTZ and losartan due to JING   started hydralazine 25mg po TID in place  Metoprolol 50mg po BID    #HLD (hyperlipidemia).   chronic  Aspirin 81mg daily   Simvastatin 20mg po daily     #Need for prophylactic measure.   VTE ppx: lovenox 40mg sq BID ; d-dimer has trended down and normalized so off full dose A/C  GI ppx with protonix    #Disp.  d/c home when able to wean down O2 support to ~4L NC

## 2022-01-24 NOTE — PROGRESS NOTE ADULT - TIME BILLING
Note written by attending, see above.  Time spent: 40min. More than 50% of the visit was spent counseling the patient on medical condition - acute hypoxic resp failure due to COVID PNA, venturi mask vs NC, Remdesivir, decadron, chest pain, JING.

## 2022-01-24 NOTE — PROGRESS NOTE ADULT - ASSESSMENT
62 yo obese F with pmh Dm2 on insulin. HTN, HLD who presents with 1 week of dry cough, intermittent chest pain induced by coughing, myalgias admitted with acute respiratory failure with hypoxia sec to COVID PNA      Renal cx noted - IVF - JING eval - serial renal indices  ID eval noted  vs noted  labs reviewed  fio2 titration in progress -   on FULL DOSE Lovenox    serial D dimer  isolation for Covid  robitussin - tylenol for sx management  remdesivir - decadron - covid with hypoxemia  ct chest - viral pna  fio2 titration - keep sat > 88 pct  dvt p  [facility]vs rx regimen for HLD - HTN -   DM care - serial FS -   education  counseling  emotional support

## 2022-01-24 NOTE — DIETITIAN INITIAL EVALUATION ADULT. - OTHER INFO
61 year old female Dx acute respiratory failure with hypoxia 2/2 COVID 19  PMH DM HTN   patient on humalin kwik pen and januvia at home with good DM control A1c 6.7% follows NCS diet   1/23 BM

## 2022-01-24 NOTE — PROGRESS NOTE ADULT - SUBJECTIVE AND OBJECTIVE BOX
Patient is a 61y old  Female who presents with a chief complaint of cough, SOB.        INTERVAL HPI/OVERNIGHT EVENTS: Pt states she is improving. Pt admits VEE and cough (gradually improving). Denies SOB at rest on 6L NC. Saturation was down to the high 80s this morning on the 6L NC. Denies nausea, vomiting, diarrhea, fever, chills.     MEDICATIONS  (STANDING):  amLODIPine   Tablet 10 milliGRAM(s) Oral daily  aspirin enteric coated 81 milliGRAM(s) Oral daily  benzonatate 100 milliGRAM(s) Oral three times a day  dexAMETHasone     Tablet 6 milliGRAM(s) Oral daily  dextrose 40% Gel 15 Gram(s) Oral once  dextrose 5%. 1000 milliLiter(s) (50 mL/Hr) IV Continuous <Continuous>  dextrose 5%. 1000 milliLiter(s) (100 mL/Hr) IV Continuous <Continuous>  dextrose 50% Injectable 25 Gram(s) IV Push once  dextrose 50% Injectable 12.5 Gram(s) IV Push once  dextrose 50% Injectable 25 Gram(s) IV Push once  enoxaparin Injectable 80 milliGRAM(s) SubCutaneous two times a day  glucagon  Injectable 1 milliGRAM(s) IntraMuscular once  hydrALAZINE 25 milliGRAM(s) Oral every 8 hours  metoprolol tartrate 50 milliGRAM(s) Oral two times a day  pantoprazole    Tablet 40 milliGRAM(s) Oral before breakfast  simvastatin 20 milliGRAM(s) Oral at bedtime  sodium chloride 0.9%. 1000 milliLiter(s) (50 mL/Hr) IV Continuous <Continuous>    MEDICATIONS  (PRN):  aluminum hydroxide/magnesium hydroxide/simethicone Suspension 30 milliLiter(s) Oral every 4 hours PRN Dyspepsia  guaiFENesin Oral Liquid (Sugar-Free) 200 milliGRAM(s) Oral every 6 hours PRN Cough          Allergies    No Known Allergies    Intolerances        REVIEW OF SYSTEMS:  CONSTITUTIONAL: No fever or chills  HEENT:  No headache, no sore throat  RESPIRATORY: +cough, no shortness of breath at rest on NC; +VEE  CARDIOVASCULAR: no chest pain or palpitations  GASTROINTESTINAL: No abd pain, nausea, vomiting, or diarrhea  GENITOURINARY: No dysuria, frequency, or hematuria  NEUROLOGICAL: no focal weakness or dizziness  MUSCULOSKELETAL: no myalgias     Vital Signs Last 24 Hrs  T(C): 36.6 (24 Jan 2022 05:30)  T(F): 97.8 (24 Jan 2022 05:30)  HR: 66 (24 Jan 2022 07:40)  BP: 156/75 (24 Jan 2022 05:30)  RR: 18 (24 Jan 2022 05:30)  SpO2: 89% (24 Jan 2022 08:47)    PHYSICAL EXAM:  GENERAL: NAD at rest   HEENT:  anicteric, moist mucous membranes  CHEST/LUNG:  coarse breath sounds b/l  HEART:  RRR, S1, S2  ABDOMEN:  BS+, soft, nontender, nondistended  EXTREMITIES: no cyanosis, or calf tenderness  NERVOUS SYSTEM: answers questions and follows commands appropriately    LABS:                                     13.8   9.20  )-----------( 322      ( 24 Jan 2022 07:53 )             39.8     CBC Full  -  ( 24 Jan 2022 07:53 )  WBC Count : 9.20 K/uL  Hemoglobin : 13.8 g/dL  Hematocrit : 39.8 %  Platelet Count - Automated : 322 K/uL  Mean Cell Volume : 86.9 fl  Mean Cell Hemoglobin : 30.1 pg  Mean Cell Hemoglobin Concentration : 34.7 gm/dL  Auto Neutrophil # : 8.33 K/uL  Auto Lymphocyte # : 0.54 K/uL  Auto Monocyte # : 0.29 K/uL  Auto Eosinophil # : 0.00 K/uL  Auto Basophil # : 0.00 K/uL  Auto Neutrophil % : 90.5 %  Auto Lymphocyte % : 5.9 %  Auto Monocyte % : 3.2 %  Auto Eosinophil % : 0.0 %  Auto Basophil % : 0.0 %    01-24    137  |  104  |  38<H>  ----------------------------<  96  4.4   |  26  |  1.20    Ca    9.7      24 Jan 2022 07:53  Phos  3.2     01-23  Mg     2.7     01-23    TPro  6.9  /  Alb  2.8<L>  /  TBili  0.6  /  DBili  0.1  /  AST  45<H>  /  ALT  69  /  AlkPhos  57  01-24            Culture - Urine (collected 01-18-22 @ 00:43)  Source: Clean Catch Clean Catch (Midstream)  Final Report (01-18-22 @ 19:50):    <10,000 CFU/mL Normal Urogenital Mariel    Culture - Blood (collected 01-17-22 @ 18:06)  Source: .Blood Blood-Peripheral  Preliminary Report (01-18-22 @ 19:02):    No growth to date.    Culture - Blood (collected 01-17-22 @ 17:59)  Source: .Blood Blood-Peripheral  Preliminary Report (01-18-22 @ 18:01):    No growth to date.        RADIOLOGY & ADDITIONAL TESTS:    Personally reviewed.     Consultant(s) Notes Reviewed:  [x] YES  [ ] NO     Patient is a 61y old  Female who presents with a chief complaint of cough, SOB.         INTERVAL HPI/OVERNIGHT EVENTS: Pt states she is improving. Pt admits VEE and cough (gradually improving). Denies SOB at rest on 6L NC. Saturation was down to the high 80s this morning on the 6L NC. Denies nausea, vomiting, diarrhea, fever, chills.     MEDICATIONS  (STANDING):  amLODIPine   Tablet 10 milliGRAM(s) Oral daily  aspirin enteric coated 81 milliGRAM(s) Oral daily  benzonatate 100 milliGRAM(s) Oral three times a day  dexAMETHasone     Tablet 6 milliGRAM(s) Oral daily  dextrose 40% Gel 15 Gram(s) Oral once  dextrose 5%. 1000 milliLiter(s) (50 mL/Hr) IV Continuous <Continuous>  dextrose 5%. 1000 milliLiter(s) (100 mL/Hr) IV Continuous <Continuous>  dextrose 50% Injectable 25 Gram(s) IV Push once  dextrose 50% Injectable 12.5 Gram(s) IV Push once  dextrose 50% Injectable 25 Gram(s) IV Push once  enoxaparin Injectable 80 milliGRAM(s) SubCutaneous two times a day  glucagon  Injectable 1 milliGRAM(s) IntraMuscular once  hydrALAZINE 25 milliGRAM(s) Oral every 8 hours  metoprolol tartrate 50 milliGRAM(s) Oral two times a day  pantoprazole    Tablet 40 milliGRAM(s) Oral before breakfast  simvastatin 20 milliGRAM(s) Oral at bedtime  sodium chloride 0.9%. 1000 milliLiter(s) (50 mL/Hr) IV Continuous <Continuous>    MEDICATIONS  (PRN):  aluminum hydroxide/magnesium hydroxide/simethicone Suspension 30 milliLiter(s) Oral every 4 hours PRN Dyspepsia  guaiFENesin Oral Liquid (Sugar-Free) 200 milliGRAM(s) Oral every 6 hours PRN Cough          Allergies    No Known Allergies    Intolerances        REVIEW OF SYSTEMS:  CONSTITUTIONAL: No fever or chills  HEENT:  No headache, no sore throat  RESPIRATORY: +cough, no shortness of breath at rest on NC; +VEE  CARDIOVASCULAR: no chest pain or palpitations  GASTROINTESTINAL: No abd pain, nausea, vomiting, or diarrhea  GENITOURINARY: No dysuria, frequency, or hematuria  NEUROLOGICAL: no focal weakness or dizziness  MUSCULOSKELETAL: no myalgias     Vital Signs Last 24 Hrs  T(C): 36.6 (24 Jan 2022 05:30)  T(F): 97.8 (24 Jan 2022 05:30)  HR: 66 (24 Jan 2022 07:40)  BP: 156/75 (24 Jan 2022 05:30)  RR: 18 (24 Jan 2022 05:30)  SpO2: 89% (24 Jan 2022 08:47)    PHYSICAL EXAM:  GENERAL: NAD at rest   HEENT:  anicteric, moist mucous membranes  CHEST/LUNG:  coarse breath sounds b/l  HEART:  RRR, S1, S2  ABDOMEN:  BS+, soft, nontender, nondistended  EXTREMITIES: no cyanosis, or calf tenderness  NERVOUS SYSTEM: answers questions and follows commands appropriately    LABS:                                     13.8   9.20  )-----------( 322      ( 24 Jan 2022 07:53 )             39.8     CBC Full  -  ( 24 Jan 2022 07:53 )  WBC Count : 9.20 K/uL  Hemoglobin : 13.8 g/dL  Hematocrit : 39.8 %  Platelet Count - Automated : 322 K/uL  Mean Cell Volume : 86.9 fl  Mean Cell Hemoglobin : 30.1 pg  Mean Cell Hemoglobin Concentration : 34.7 gm/dL  Auto Neutrophil # : 8.33 K/uL  Auto Lymphocyte # : 0.54 K/uL  Auto Monocyte # : 0.29 K/uL  Auto Eosinophil # : 0.00 K/uL  Auto Basophil # : 0.00 K/uL  Auto Neutrophil % : 90.5 %  Auto Lymphocyte % : 5.9 %  Auto Monocyte % : 3.2 %  Auto Eosinophil % : 0.0 %  Auto Basophil % : 0.0 %    01-24    137  |  104  |  38<H>  ----------------------------<  96  4.4   |  26  |  1.20    Ca    9.7      24 Jan 2022 07:53  Phos  3.2     01-23  Mg     2.7     01-23    TPro  6.9  /  Alb  2.8<L>  /  TBili  0.6  /  DBili  0.1  /  AST  45<H>  /  ALT  69  /  AlkPhos  57  01-24            Culture - Urine (collected 01-18-22 @ 00:43)  Source: Clean Catch Clean Catch (Midstream)  Final Report (01-18-22 @ 19:50):    <10,000 CFU/mL Normal Urogenital Mariel    Culture - Blood (collected 01-17-22 @ 18:06)  Source: .Blood Blood-Peripheral  Preliminary Report (01-18-22 @ 19:02):    No growth to date.    Culture - Blood (collected 01-17-22 @ 17:59)  Source: .Blood Blood-Peripheral  Preliminary Report (01-18-22 @ 18:01):    No growth to date.        RADIOLOGY & ADDITIONAL TESTS:    Personally reviewed.     Consultant(s) Notes Reviewed:  [x] YES  [ ] NO

## 2022-01-24 NOTE — DIETITIAN INITIAL EVALUATION ADULT. - ORAL INTAKE PTA/DIET HISTORY
patient seen in room walking to bed. states with good PO intake confirmed by RN  concerned about blood sugars patient on decadron with DM type 2 discussed blood sugar control and will cut back on carbohydrates while in hospital .  no food allergies, no problems chewing swallowing

## 2022-01-25 LAB
ALBUMIN SERPL ELPH-MCNC: 2.6 G/DL — LOW (ref 3.3–5)
ALBUMIN SERPL ELPH-MCNC: 2.7 G/DL — LOW (ref 3.3–5)
ALP SERPL-CCNC: 48 U/L — SIGNIFICANT CHANGE UP (ref 40–120)
ALP SERPL-CCNC: 49 U/L — SIGNIFICANT CHANGE UP (ref 40–120)
ALT FLD-CCNC: 63 U/L — SIGNIFICANT CHANGE UP (ref 12–78)
ALT FLD-CCNC: 63 U/L — SIGNIFICANT CHANGE UP (ref 12–78)
ANION GAP SERPL CALC-SCNC: 6 MMOL/L — SIGNIFICANT CHANGE UP (ref 5–17)
AST SERPL-CCNC: 37 U/L — SIGNIFICANT CHANGE UP (ref 15–37)
AST SERPL-CCNC: 38 U/L — HIGH (ref 15–37)
BASOPHILS # BLD AUTO: 0.01 K/UL — SIGNIFICANT CHANGE UP (ref 0–0.2)
BASOPHILS NFR BLD AUTO: 0.1 % — SIGNIFICANT CHANGE UP (ref 0–2)
BILIRUB DIRECT SERPL-MCNC: 0.2 MG/DL — SIGNIFICANT CHANGE UP (ref 0–0.3)
BILIRUB INDIRECT FLD-MCNC: 0.4 MG/DL — SIGNIFICANT CHANGE UP (ref 0.2–1)
BILIRUB SERPL-MCNC: 0.6 MG/DL — SIGNIFICANT CHANGE UP (ref 0.2–1.2)
BILIRUB SERPL-MCNC: 0.6 MG/DL — SIGNIFICANT CHANGE UP (ref 0.2–1.2)
BUN SERPL-MCNC: 29 MG/DL — HIGH (ref 7–23)
CALCIUM SERPL-MCNC: 9.4 MG/DL — SIGNIFICANT CHANGE UP (ref 8.5–10.1)
CHLORIDE SERPL-SCNC: 103 MMOL/L — SIGNIFICANT CHANGE UP (ref 96–108)
CO2 SERPL-SCNC: 26 MMOL/L — SIGNIFICANT CHANGE UP (ref 22–31)
CREAT SERPL-MCNC: 1 MG/DL — SIGNIFICANT CHANGE UP (ref 0.5–1.3)
EOSINOPHIL # BLD AUTO: 0 K/UL — SIGNIFICANT CHANGE UP (ref 0–0.5)
EOSINOPHIL NFR BLD AUTO: 0 % — SIGNIFICANT CHANGE UP (ref 0–6)
GLUCOSE SERPL-MCNC: 97 MG/DL — SIGNIFICANT CHANGE UP (ref 70–99)
HCT VFR BLD CALC: 36.1 % — SIGNIFICANT CHANGE UP (ref 34.5–45)
HGB BLD-MCNC: 12.7 G/DL — SIGNIFICANT CHANGE UP (ref 11.5–15.5)
IMM GRANULOCYTES NFR BLD AUTO: 1.1 % — SIGNIFICANT CHANGE UP (ref 0–1.5)
INR BLD: 1.06 RATIO — SIGNIFICANT CHANGE UP (ref 0.88–1.16)
LYMPHOCYTES # BLD AUTO: 0.41 K/UL — LOW (ref 1–3.3)
LYMPHOCYTES # BLD AUTO: 5 % — LOW (ref 13–44)
MAGNESIUM SERPL-MCNC: 2.4 MG/DL — SIGNIFICANT CHANGE UP (ref 1.6–2.6)
MCHC RBC-ENTMCNC: 30 PG — SIGNIFICANT CHANGE UP (ref 27–34)
MCHC RBC-ENTMCNC: 35.2 GM/DL — SIGNIFICANT CHANGE UP (ref 32–36)
MCV RBC AUTO: 85.1 FL — SIGNIFICANT CHANGE UP (ref 80–100)
MONOCYTES # BLD AUTO: 0.31 K/UL — SIGNIFICANT CHANGE UP (ref 0–0.9)
MONOCYTES NFR BLD AUTO: 3.8 % — SIGNIFICANT CHANGE UP (ref 2–14)
NEUTROPHILS # BLD AUTO: 7.32 K/UL — SIGNIFICANT CHANGE UP (ref 1.8–7.4)
NEUTROPHILS NFR BLD AUTO: 90 % — HIGH (ref 43–77)
NRBC # BLD: 0 /100 WBCS — SIGNIFICANT CHANGE UP (ref 0–0)
PLATELET # BLD AUTO: 320 K/UL — SIGNIFICANT CHANGE UP (ref 150–400)
POTASSIUM SERPL-MCNC: 3.9 MMOL/L — SIGNIFICANT CHANGE UP (ref 3.5–5.3)
POTASSIUM SERPL-SCNC: 3.9 MMOL/L — SIGNIFICANT CHANGE UP (ref 3.5–5.3)
PROT SERPL-MCNC: 6.3 G/DL — SIGNIFICANT CHANGE UP (ref 6–8.3)
PROT SERPL-MCNC: 6.4 G/DL — SIGNIFICANT CHANGE UP (ref 6–8.3)
PROTHROM AB SERPL-ACNC: 12.4 SEC — SIGNIFICANT CHANGE UP (ref 10.6–13.6)
RBC # BLD: 4.24 M/UL — SIGNIFICANT CHANGE UP (ref 3.8–5.2)
RBC # FLD: 12.5 % — SIGNIFICANT CHANGE UP (ref 10.3–14.5)
SODIUM SERPL-SCNC: 135 MMOL/L — SIGNIFICANT CHANGE UP (ref 135–145)
WBC # BLD: 8.14 K/UL — SIGNIFICANT CHANGE UP (ref 3.8–10.5)
WBC # FLD AUTO: 8.14 K/UL — SIGNIFICANT CHANGE UP (ref 3.8–10.5)

## 2022-01-25 PROCEDURE — 99233 SBSQ HOSP IP/OBS HIGH 50: CPT

## 2022-01-25 RX ORDER — HYDRALAZINE HCL 50 MG
50 TABLET ORAL
Refills: 0 | Status: DISCONTINUED | OUTPATIENT
Start: 2022-01-25 | End: 2022-01-27

## 2022-01-25 RX ADMIN — Medication 1: at 11:26

## 2022-01-25 RX ADMIN — PANTOPRAZOLE SODIUM 40 MILLIGRAM(S): 20 TABLET, DELAYED RELEASE ORAL at 05:09

## 2022-01-25 RX ADMIN — Medication 50 MILLIGRAM(S): at 05:09

## 2022-01-25 RX ADMIN — Medication 100 MILLIGRAM(S): at 11:26

## 2022-01-25 RX ADMIN — SIMVASTATIN 20 MILLIGRAM(S): 20 TABLET, FILM COATED ORAL at 21:16

## 2022-01-25 RX ADMIN — ENOXAPARIN SODIUM 40 MILLIGRAM(S): 100 INJECTION SUBCUTANEOUS at 17:16

## 2022-01-25 RX ADMIN — Medication 1: at 17:14

## 2022-01-25 RX ADMIN — ENOXAPARIN SODIUM 40 MILLIGRAM(S): 100 INJECTION SUBCUTANEOUS at 05:10

## 2022-01-25 RX ADMIN — Medication 25 MILLIGRAM(S): at 05:09

## 2022-01-25 RX ADMIN — Medication 200 MILLIGRAM(S): at 11:26

## 2022-01-25 RX ADMIN — Medication 100 MILLIGRAM(S): at 21:16

## 2022-01-25 RX ADMIN — Medication 81 MILLIGRAM(S): at 11:26

## 2022-01-25 RX ADMIN — Medication 100 MILLIGRAM(S): at 05:10

## 2022-01-25 RX ADMIN — Medication 6 MILLIGRAM(S): at 05:10

## 2022-01-25 RX ADMIN — AMLODIPINE BESYLATE 10 MILLIGRAM(S): 2.5 TABLET ORAL at 05:10

## 2022-01-25 RX ADMIN — Medication 50 MILLIGRAM(S): at 17:17

## 2022-01-25 NOTE — PROGRESS NOTE ADULT - SUBJECTIVE AND OBJECTIVE BOX
Patient is a 61y old  Female who presents with a chief complaint of cough, SOB.         INTERVAL HPI/OVERNIGHT EVENTS: Pt states she is improving. Pt admits VEE and cough (gradually improving). Denies SOB at rest on 6L NC. Saturation was down to the high 80s this morning on the 6L NC. Denies nausea, vomiting, diarrhea, fever, chills.     MEDICATIONS  (STANDING):  amLODIPine   Tablet 10 milliGRAM(s) Oral daily  aspirin enteric coated 81 milliGRAM(s) Oral daily  benzonatate 100 milliGRAM(s) Oral three times a day  dexAMETHasone     Tablet 6 milliGRAM(s) Oral daily  dextrose 40% Gel 15 Gram(s) Oral once  dextrose 5%. 1000 milliLiter(s) (50 mL/Hr) IV Continuous <Continuous>  dextrose 5%. 1000 milliLiter(s) (100 mL/Hr) IV Continuous <Continuous>  dextrose 50% Injectable 25 Gram(s) IV Push once  dextrose 50% Injectable 12.5 Gram(s) IV Push once  dextrose 50% Injectable 25 Gram(s) IV Push once  enoxaparin Injectable 80 milliGRAM(s) SubCutaneous two times a day  glucagon  Injectable 1 milliGRAM(s) IntraMuscular once  hydrALAZINE 25 milliGRAM(s) Oral every 8 hours  metoprolol tartrate 50 milliGRAM(s) Oral two times a day  pantoprazole    Tablet 40 milliGRAM(s) Oral before breakfast  simvastatin 20 milliGRAM(s) Oral at bedtime  sodium chloride 0.9%. 1000 milliLiter(s) (50 mL/Hr) IV Continuous <Continuous>    MEDICATIONS  (PRN):  aluminum hydroxide/magnesium hydroxide/simethicone Suspension 30 milliLiter(s) Oral every 4 hours PRN Dyspepsia  guaiFENesin Oral Liquid (Sugar-Free) 200 milliGRAM(s) Oral every 6 hours PRN Cough          Allergies    No Known Allergies    Intolerances        REVIEW OF SYSTEMS:  CONSTITUTIONAL: No fever or chills  HEENT:  No headache, no sore throat  RESPIRATORY: +cough, no shortness of breath at rest on NC; +VEE  CARDIOVASCULAR: no chest pain or palpitations  GASTROINTESTINAL: No abd pain, nausea, vomiting, or diarrhea  GENITOURINARY: No dysuria, frequency, or hematuria  NEUROLOGICAL: no focal weakness or dizziness  MUSCULOSKELETAL: no myalgias     Vital Signs Last 24 Hrs  T(C): 36.6 (24 Jan 2022 05:30)  T(F): 97.8 (24 Jan 2022 05:30)  HR: 66 (24 Jan 2022 07:40)  BP: 156/75 (24 Jan 2022 05:30)  RR: 18 (24 Jan 2022 05:30)  SpO2: 89% (24 Jan 2022 08:47)    PHYSICAL EXAM:  GENERAL: NAD at rest   HEENT:  anicteric, moist mucous membranes  CHEST/LUNG:  coarse breath sounds b/l  HEART:  RRR, S1, S2  ABDOMEN:  BS+, soft, nontender, nondistended  EXTREMITIES: no cyanosis, or calf tenderness  NERVOUS SYSTEM: answers questions and follows commands appropriately    LABS:                                     13.8   9.20  )-----------( 322      ( 24 Jan 2022 07:53 )             39.8     CBC Full  -  ( 24 Jan 2022 07:53 )  WBC Count : 9.20 K/uL  Hemoglobin : 13.8 g/dL  Hematocrit : 39.8 %  Platelet Count - Automated : 322 K/uL  Mean Cell Volume : 86.9 fl  Mean Cell Hemoglobin : 30.1 pg  Mean Cell Hemoglobin Concentration : 34.7 gm/dL  Auto Neutrophil # : 8.33 K/uL  Auto Lymphocyte # : 0.54 K/uL  Auto Monocyte # : 0.29 K/uL  Auto Eosinophil # : 0.00 K/uL  Auto Basophil # : 0.00 K/uL  Auto Neutrophil % : 90.5 %  Auto Lymphocyte % : 5.9 %  Auto Monocyte % : 3.2 %  Auto Eosinophil % : 0.0 %  Auto Basophil % : 0.0 %    01-24    137  |  104  |  38<H>  ----------------------------<  96  4.4   |  26  |  1.20    Ca    9.7      24 Jan 2022 07:53  Phos  3.2     01-23  Mg     2.7     01-23    TPro  6.9  /  Alb  2.8<L>  /  TBili  0.6  /  DBili  0.1  /  AST  45<H>  /  ALT  69  /  AlkPhos  57  01-24            Culture - Urine (collected 01-18-22 @ 00:43)  Source: Clean Catch Clean Catch (Midstream)  Final Report (01-18-22 @ 19:50):    <10,000 CFU/mL Normal Urogenital Mariel    Culture - Blood (collected 01-17-22 @ 18:06)  Source: .Blood Blood-Peripheral  Preliminary Report (01-18-22 @ 19:02):    No growth to date.    Culture - Blood (collected 01-17-22 @ 17:59)  Source: .Blood Blood-Peripheral  Preliminary Report (01-18-22 @ 18:01):    No growth to date.        RADIOLOGY & ADDITIONAL TESTS:    Personally reviewed.     Consultant(s) Notes Reviewed:  [x] YES  [ ] NO     Patient is a 61y old  Female who presents with a chief complaint of cough, SOB.         INTERVAL HPI/OVERNIGHT EVENTS: Pt states she is improving. Pt admits VEE and cough (gradually improving). Denies SOB at rest on 6L NC. Denies nausea, vomiting, diarrhea, fever, chills.     MEDICATIONS  (STANDING):  amLODIPine   Tablet 10 milliGRAM(s) Oral daily  aspirin enteric coated 81 milliGRAM(s) Oral daily  benzonatate 100 milliGRAM(s) Oral three times a day  dextrose 40% Gel 15 Gram(s) Oral once  dextrose 5%. 1000 milliLiter(s) (50 mL/Hr) IV Continuous <Continuous>  dextrose 5%. 1000 milliLiter(s) (100 mL/Hr) IV Continuous <Continuous>  dextrose 50% Injectable 25 Gram(s) IV Push once  dextrose 50% Injectable 12.5 Gram(s) IV Push once  dextrose 50% Injectable 25 Gram(s) IV Push once  enoxaparin Injectable 40 milliGRAM(s) SubCutaneous two times a day  glucagon  Injectable 1 milliGRAM(s) IntraMuscular once  hydrALAZINE 50 milliGRAM(s) Oral two times a day  insulin lispro (ADMELOG) corrective regimen sliding scale   SubCutaneous three times a day before meals  metoprolol tartrate 50 milliGRAM(s) Oral two times a day  pantoprazole    Tablet 40 milliGRAM(s) Oral before breakfast  simvastatin 20 milliGRAM(s) Oral at bedtime    MEDICATIONS  (PRN):  aluminum hydroxide/magnesium hydroxide/simethicone Suspension 30 milliLiter(s) Oral every 4 hours PRN Dyspepsia  guaiFENesin Oral Liquid (Sugar-Free) 200 milliGRAM(s) Oral every 6 hours PRN Cough            Allergies    No Known Allergies    Intolerances        REVIEW OF SYSTEMS:  CONSTITUTIONAL: No fever or chills  HEENT:  No headache, no sore throat  RESPIRATORY: +cough, no shortness of breath at rest on NC; +VEE  CARDIOVASCULAR: no chest pain or palpitations  GASTROINTESTINAL: No abd pain, nausea, vomiting, or diarrhea  GENITOURINARY: No dysuria, frequency, or hematuria  NEUROLOGICAL: no focal weakness or dizziness  MUSCULOSKELETAL: no myalgias     Vital Signs Last 24 Hrs  T(C): 36.4 (25 Jan 2022 05:04), Max: 36.6 (24 Jan 2022 05:30)  T(F): 97.5 (25 Jan 2022 05:04), Max: 97.8 (24 Jan 2022 05:30)  HR: 84 (25 Jan 2022 05:04) (66 - 89)  BP: 164/78 (25 Jan 2022 05:04) (137/74 - 170/79)  BP(mean): --  RR: 18 (25 Jan 2022 05:04) (17 - 18)  SpO2: 94% (25 Jan 2022 05:04) (89% - 96%)    PHYSICAL EXAM:  GENERAL: NAD at rest   HEENT:  anicteric, moist mucous membranes  CHEST/LUNG:  coarse breath sounds b/l  HEART:  RRR, S1, S2  ABDOMEN:  BS+, soft, nontender, nondistended  EXTREMITIES: no cyanosis, or calf tenderness  NERVOUS SYSTEM: answers questions and follows commands appropriately    LABS:                                                12.7   8.14  )-----------( 320      ( 25 Jan 2022 07:24 )             36.1     CBC Full  -  ( 25 Jan 2022 07:24 )  WBC Count : 8.14 K/uL  Hemoglobin : 12.7 g/dL  Hematocrit : 36.1 %  Platelet Count - Automated : 320 K/uL  Mean Cell Volume : 85.1 fl  Mean Cell Hemoglobin : 30.0 pg  Mean Cell Hemoglobin Concentration : 35.2 gm/dL  Auto Neutrophil # : 7.32 K/uL  Auto Lymphocyte # : 0.41 K/uL  Auto Monocyte # : 0.31 K/uL  Auto Eosinophil # : 0.00 K/uL  Auto Basophil # : 0.01 K/uL  Auto Neutrophil % : 90.0 %  Auto Lymphocyte % : 5.0 %  Auto Monocyte % : 3.8 %  Auto Eosinophil % : 0.0 %  Auto Basophil % : 0.1 %    01-25    135  |  103  |  29<H>  ----------------------------<  97  3.9   |  26  |  1.00    Ca    9.4      25 Jan 2022 07:24  Mg     2.4     01-25    TPro  6.3  /  Alb  2.6<L>  /  TBili  0.6  /  DBili  0.2  /  AST  38<H>  /  ALT  63  /  AlkPhos  48  01-25              Culture - Urine (collected 01-18-22 @ 00:43)  Source: Clean Catch Clean Catch (Midstream)  Final Report (01-18-22 @ 19:50):    <10,000 CFU/mL Normal Urogenital Mariel    Culture - Blood (collected 01-17-22 @ 18:06)  Source: .Blood Blood-Peripheral  Preliminary Report (01-18-22 @ 19:02):    No growth to date.    Culture - Blood (collected 01-17-22 @ 17:59)  Source: .Blood Blood-Peripheral  Preliminary Report (01-18-22 @ 18:01):    No growth to date.        RADIOLOGY & ADDITIONAL TESTS:    Personally reviewed.     Consultant(s) Notes Reviewed:  [x] YES  [ ] NO

## 2022-01-25 NOTE — PROGRESS NOTE ADULT - SUBJECTIVE AND OBJECTIVE BOX
Date/Time Patient Seen:  		  Referring MD:   Data Reviewed	       Patient is a 61y old  Female who presents with a chief complaint of cough, covid exposure (24 Jan 2022 10:51)      Subjective/HPI     PAST MEDICAL & SURGICAL HISTORY:  HTN (hypertension)  resistant type,, &gt;3 bp meds    DM (diabetes mellitus)  type 2-insulin dependenct    No significant past surgical history          Medication list         MEDICATIONS  (STANDING):  amLODIPine   Tablet 10 milliGRAM(s) Oral daily  aspirin enteric coated 81 milliGRAM(s) Oral daily  benzonatate 100 milliGRAM(s) Oral three times a day  dexAMETHasone     Tablet 6 milliGRAM(s) Oral daily  dextrose 40% Gel 15 Gram(s) Oral once  dextrose 5%. 1000 milliLiter(s) (50 mL/Hr) IV Continuous <Continuous>  dextrose 5%. 1000 milliLiter(s) (100 mL/Hr) IV Continuous <Continuous>  dextrose 50% Injectable 25 Gram(s) IV Push once  dextrose 50% Injectable 12.5 Gram(s) IV Push once  dextrose 50% Injectable 25 Gram(s) IV Push once  enoxaparin Injectable 40 milliGRAM(s) SubCutaneous two times a day  glucagon  Injectable 1 milliGRAM(s) IntraMuscular once  hydrALAZINE 25 milliGRAM(s) Oral every 8 hours  insulin lispro (ADMELOG) corrective regimen sliding scale   SubCutaneous three times a day before meals  metoprolol tartrate 50 milliGRAM(s) Oral two times a day  pantoprazole    Tablet 40 milliGRAM(s) Oral before breakfast  simvastatin 20 milliGRAM(s) Oral at bedtime    MEDICATIONS  (PRN):  aluminum hydroxide/magnesium hydroxide/simethicone Suspension 30 milliLiter(s) Oral every 4 hours PRN Dyspepsia  guaiFENesin Oral Liquid (Sugar-Free) 200 milliGRAM(s) Oral every 6 hours PRN Cough         Vitals log        ICU Vital Signs Last 24 Hrs  T(C): 36.4 (25 Jan 2022 05:04), Max: 36.6 (24 Jan 2022 05:30)  T(F): 97.5 (25 Jan 2022 05:04), Max: 97.8 (24 Jan 2022 05:30)  HR: 84 (25 Jan 2022 05:04) (66 - 89)  BP: 164/78 (25 Jan 2022 05:04) (137/74 - 170/79)  BP(mean): --  ABP: --  ABP(mean): --  RR: 18 (25 Jan 2022 05:04) (17 - 18)  SpO2: 94% (25 Jan 2022 05:04) (89% - 96%)           Input and Output:  I&O's Detail    23 Jan 2022 07:01  -  24 Jan 2022 07:00  --------------------------------------------------------  IN:    sodium chloride 0.9%: 150 mL  Total IN: 150 mL    OUT:  Total OUT: 0 mL    Total NET: 150 mL          Lab Data                        13.8   9.20  )-----------( 322      ( 24 Jan 2022 07:53 )             39.8     01-24    137  |  104  |  38<H>  ----------------------------<  96  4.4   |  26  |  1.20    Ca    9.7      24 Jan 2022 07:53  Phos  3.2     01-23  Mg     2.7     01-23    TPro  6.9  /  Alb  2.8<L>  /  TBili  0.6  /  DBili  0.1  /  AST  45<H>  /  ALT  69  /  AlkPhos  57  01-24      CARDIAC MARKERS ( 24 Jan 2022 07:53 )  x     / x     / 365 U/L / x     / x            Review of Systems	      Objective     Physical Examination    heart s1s2  lung dec BS  abd soft  on o2 support      Pertinent Lab findings & Imaging      Frank:  NO   Adequate UO     I&O's Detail    23 Jan 2022 07:01  -  24 Jan 2022 07:00  --------------------------------------------------------  IN:    sodium chloride 0.9%: 150 mL  Total IN: 150 mL    OUT:  Total OUT: 0 mL    Total NET: 150 mL               Discussed with:     Cultures:	        Radiology

## 2022-01-25 NOTE — PROGRESS NOTE ADULT - TIME BILLING
Note written by attending, see above.  Time spent: 40min. More than 50% of the visit was spent counseling the patient on medical condition - acute hypoxic resp failure due to COVID PNA, venturi mask vs NC, Remdesivir, decadron, chest pain, JING. Note written by attending, see above.  Time spent: 40min. More than 50% of the visit was spent counseling the patient on medical condition - acute hypoxic resp failure due to COVID PNA, venturi mask vs NC, Remdesivir, decadron, chest pain, JING, BP meds, hydralazine.

## 2022-01-25 NOTE — PROGRESS NOTE ADULT - ASSESSMENT
60 yo obese F with pmh Dm2 on insulin. HTN, HLD who presents with 1 week of dry cough, intermittent chest pain induced by coughing, myalgias admitted with acute respiratory failure with hypoxia sec to COVID PNA      Renal cx noted - s/p IVF - JING eval - serial renal indices  ID eval noted  vs noted  labs reviewed  fio2 titration in progress -   dvt p - lovenox  BP optimization in progress  repeat D dimer noted      serial D dimer  isolation for Covid  robitussin - tylenol for sx management  remdesivir - decadron - covid with hypoxemia  ct chest - viral pna  fio2 titration - keep sat > 88 pct  dvt p  cvs rx regimen for HLD - HTN -   DM care - serial FS -   education  counseling  emotional support

## 2022-01-25 NOTE — PROGRESS NOTE ADULT - ASSESSMENT
62yo obese F with PMH of Type 2 DM on insulin, HTN, HLD who presents with 1 week of dry cough, intermittent chest pain induced by coughing, myalgias admitted with acute respiratory failure with hypoxia sec to COVID PNA.    #Acute respiratory failure with hypoxia.   due to COVID PNA (pt unvaccinated)  maintain sats above 88% with O2 supplemental support.   taper down O2 as able, currently on 6L NC and saturating in the low 90s -- will try to wean down flow to 4L NC this afternoon  CTA did not show PE, but consistent with COVID PNA  cont decadron 6mg daily (day 9/10)  completed remdesivir x5 days  VTE ppx with full dose lovenox due to high-risk, high d-dimer  recheck d-dimer in AM  pulm (Stevo), recs appreciated    #JING.   Cr up to 1.4 on 1/23 from ~0.8 a couple of days prior ; now improving 1.0 today  likely multifactorial - might be somewhat pre-renal with contribution to JING from HCTZ and losartan  hold HCTZ and losartan  nephro consulted ([facility] group), recs appreciated - nephro ordered gentle IV hydration with NS at 50cc/hr for 1 day    #Chest pain.   may be MSK from cough or could be reflux   EKG performed and was NSR similar to admission EKG without signs of acute ischemia   r/out cardiac ischemia with Rolly -- first set showing CK: 419; CKMB: 4.1; HS Troponin: 11.9 -- repeat Rolly in 6 hours were the same with very low HStroponin - ACS ruled out  also checked lipase which was normal  given Maalox and had improvement, so was likely acid reflux  monitor closely    #Type 2 diabetes mellitus.   chronic   low dose lispro ISS   continue with hypoglycemic protocol  diabetic diet.    #HTN (hypertension).   cont home meds with hold parameters  Amlodipine 10mg po daily   hold HCTZ and losartan due to JING   increase to hydralazine 50mg po TID   Metoprolol 50mg po BID    #HLD (hyperlipidemia).   chronic  Aspirin 81mg daily   Simvastatin 20mg po daily     #Need for prophylactic measure.   VTE ppx: lovenox 40mg sq BID ; d-dimer has trended down and normalized so off full dose A/C  GI ppx with protonix    #Disp.  d/c home when able to wean down O2 support to ~4L NC       62yo obese F with PMH of Type 2 DM on insulin, HTN, HLD who presents with 1 week of dry cough, intermittent chest pain induced by coughing, myalgias admitted with acute respiratory failure with hypoxia sec to COVID PNA.    #Acute respiratory failure with hypoxia.   due to COVID PNA (pt unvaccinated)  maintain sats above 88% with O2 supplemental support.   taper down O2 as able, currently on 6L NC and saturating in the low 90s -- will try to wean down flow to 4L NC this afternoon  CTA did not show PE, but consistent with COVID PNA  cont decadron 6mg daily (day 9/10)  completed remdesivir x5 days  VTE ppx  pulm (Stevo), recs appreciated    #JING.   Cr up to 1.4 on 1/23 from ~0.8 a couple of days prior ; now improving 1.0 today  likely multifactorial - might be somewhat pre-renal with contribution to JING from HCTZ and losartan  hold HCTZ and losartan  nephro consulted ([facility] group), recs appreciated - nephro ordered gentle IV hydration with NS at 50cc/hr for 1 day    #Chest pain.   may be MSK from cough or could be reflux   EKG performed and was NSR similar to admission EKG without signs of acute ischemia   r/out cardiac ischemia with Rolly -- first set showing CK: 419; CKMB: 4.1; HS Troponin: 11.9 -- repeat Rolly in 6 hours were the same with very low HStroponin - ACS ruled out  also checked lipase which was normal  given Maalox and had improvement, so was likely acid reflux  monitor closely    #Type 2 diabetes mellitus.   chronic   low dose lispro ISS   continue with hypoglycemic protocol  diabetic diet.    #HTN (hypertension).   cont home meds with hold parameters  Amlodipine 10mg po daily   hold HCTZ and losartan due to recovering JING   increase to hydralazine 50mg po TID   Metoprolol 50mg po BID    #HLD (hyperlipidemia).   chronic  Aspirin 81mg daily   Simvastatin 20mg po daily     #Need for prophylactic measure.   VTE ppx: lovenox 40mg sq BID ; d-dimer has trended down and normalized so off full dose A/C  GI ppx with protonix    #Disp.  d/c home when able to wean down O2 support to ~4L NC

## 2022-01-26 LAB
ALBUMIN SERPL ELPH-MCNC: 2.6 G/DL — LOW (ref 3.3–5)
ALP SERPL-CCNC: 42 U/L — SIGNIFICANT CHANGE UP (ref 40–120)
ALT FLD-CCNC: 55 U/L — SIGNIFICANT CHANGE UP (ref 12–78)
AST SERPL-CCNC: 29 U/L — SIGNIFICANT CHANGE UP (ref 15–37)
BILIRUB DIRECT SERPL-MCNC: 0.2 MG/DL — SIGNIFICANT CHANGE UP (ref 0–0.3)
BILIRUB INDIRECT FLD-MCNC: 0.4 MG/DL — SIGNIFICANT CHANGE UP (ref 0.2–1)
BILIRUB SERPL-MCNC: 0.6 MG/DL — SIGNIFICANT CHANGE UP (ref 0.2–1.2)
CREAT SERPL-MCNC: 0.86 MG/DL — SIGNIFICANT CHANGE UP (ref 0.5–1.3)
HCT VFR BLD CALC: 36.1 % — SIGNIFICANT CHANGE UP (ref 34.5–45)
HGB BLD-MCNC: 12.9 G/DL — SIGNIFICANT CHANGE UP (ref 11.5–15.5)
INR BLD: 0.95 RATIO — SIGNIFICANT CHANGE UP (ref 0.88–1.16)
MCHC RBC-ENTMCNC: 30.2 PG — SIGNIFICANT CHANGE UP (ref 27–34)
MCHC RBC-ENTMCNC: 35.7 GM/DL — SIGNIFICANT CHANGE UP (ref 32–36)
MCV RBC AUTO: 84.5 FL — SIGNIFICANT CHANGE UP (ref 80–100)
NRBC # BLD: 0 /100 WBCS — SIGNIFICANT CHANGE UP (ref 0–0)
PLATELET # BLD AUTO: 289 K/UL — SIGNIFICANT CHANGE UP (ref 150–400)
PROT SERPL-MCNC: 6.1 G/DL — SIGNIFICANT CHANGE UP (ref 6–8.3)
PROTHROM AB SERPL-ACNC: 11.1 SEC — SIGNIFICANT CHANGE UP (ref 10.6–13.6)
RBC # BLD: 4.27 M/UL — SIGNIFICANT CHANGE UP (ref 3.8–5.2)
RBC # FLD: 12.4 % — SIGNIFICANT CHANGE UP (ref 10.3–14.5)
WBC # BLD: 9.99 K/UL — SIGNIFICANT CHANGE UP (ref 3.8–10.5)
WBC # FLD AUTO: 9.99 K/UL — SIGNIFICANT CHANGE UP (ref 3.8–10.5)

## 2022-01-26 PROCEDURE — 99232 SBSQ HOSP IP/OBS MODERATE 35: CPT

## 2022-01-26 RX ADMIN — Medication 50 MILLIGRAM(S): at 17:33

## 2022-01-26 RX ADMIN — Medication 50 MILLIGRAM(S): at 05:10

## 2022-01-26 RX ADMIN — AMLODIPINE BESYLATE 10 MILLIGRAM(S): 2.5 TABLET ORAL at 05:11

## 2022-01-26 RX ADMIN — ENOXAPARIN SODIUM 40 MILLIGRAM(S): 100 INJECTION SUBCUTANEOUS at 05:10

## 2022-01-26 RX ADMIN — Medication 200 MILLIGRAM(S): at 17:36

## 2022-01-26 RX ADMIN — Medication 100 MILLIGRAM(S): at 13:38

## 2022-01-26 RX ADMIN — Medication 81 MILLIGRAM(S): at 13:38

## 2022-01-26 RX ADMIN — Medication 100 MILLIGRAM(S): at 21:58

## 2022-01-26 RX ADMIN — SIMVASTATIN 20 MILLIGRAM(S): 20 TABLET, FILM COATED ORAL at 21:58

## 2022-01-26 RX ADMIN — PANTOPRAZOLE SODIUM 40 MILLIGRAM(S): 20 TABLET, DELAYED RELEASE ORAL at 05:10

## 2022-01-26 RX ADMIN — Medication 6 MILLIGRAM(S): at 05:10

## 2022-01-26 RX ADMIN — Medication 100 MILLIGRAM(S): at 05:11

## 2022-01-26 RX ADMIN — ENOXAPARIN SODIUM 40 MILLIGRAM(S): 100 INJECTION SUBCUTANEOUS at 17:34

## 2022-01-26 NOTE — PHARMACOTHERAPY INTERVENTION NOTE - COMMENTS
Patient is a 61y year old Female with COVID-19 ordered for Lovenox 40mg BID for thromboprophylaxis. As per [facility] COVID-19 anticoagulation guidelines, patients with Dimer < 2xULN, CrCl >15ml/min, and BMI < 30 should be anticoagulated with prophylactic dose Lovenox at 40mg daily. Patient's BMI is borderline at 29.1. Discussed guidelines with Dr. Josue MD aware of borderline BMI and stated that patient is being discharged so change in therapy is not warranted at this time. MD inquired about extended DVT prophylaxis. Patient is > 60 years old with an IMPROVE VTE of 3. Informed MD that the preferred regimen is Xarelto 10mg daily x 30 days post discharge with an alternative option of low dose Aspirin 81mg  x 30 days post discharge per [facility] Guidelines.
Patient is a 61y F with Covid-19 ordered for Lovenox 40mg SQ daily for VTE ppx. Discussed with Dr. Maki that as per [facility] Covid-19 anticoagulation guidelines, patients with D-dimer>2x ULN (624) and CrCl =30 mL/min, the recommended dose is Lovenox 1mg/kg SQ BID or Lovenox 80mg SQ q12h (weight 79.4 kg). MD agreed and order was updated to reflect change in therapy.
Patient is a 61 year old female with COVID-19 ordered for Lovenox 80mg BID for thromboprophylaxis. As per [facility] COVID-19 anticoagulation guidelines, patients with Dimer < 2xULN, CrCl > 15ml/min, and BMI < 30 should be anticoagulated with prophylactic dose Lovenox of 40mg daily. Discussed guidelines with Dr. Ni MD believes patient is still high risk and would like to proceed with full dose Lovenox for now.

## 2022-01-26 NOTE — DISCHARGE NOTE PROVIDER - NSDCMRMEDTOKEN_GEN_ALL_CORE_FT
amLODIPine 10 mg oral tablet: 1 tab(s) orally once a day  aspirin 81 mg oral tablet: 1 tab(s) orally once a day  HumuLIN 70/30 KwikPen 70 units-30 units/mL subcutaneous suspension: 58 units in AM and 28 units in evening  hydroCHLOROthiazide 25 mg oral tablet: 1 tab(s) orally once a day  Januvia 100 mg oral tablet: 1 tab(s) orally once a day  losartan 100 mg oral tablet: 1 tab(s) orally once a day  Metoprolol Tartrate 50 mg oral tablet: 1 tab(s) orally 2 times a day  pioglitazone 15 mg oral tablet: 1 tab(s) orally once a day  simvastatin 20 mg oral tablet: 1 tab(s) orally once a day (at bedtime)   amLODIPine 10 mg oral tablet: 1 tab(s) orally once a day  aspirin 81 mg oral tablet: 1 tab(s) orally once a day  hydroCHLOROthiazide 25 mg oral tablet: 1 tab(s) orally once a day  Januvia 100 mg oral tablet: 1 tab(s) orally once a day  losartan 100 mg oral tablet: 1 tab(s) orally once a day  Metoprolol Tartrate 50 mg oral tablet: 1 tab(s) orally 2 times a day  pioglitazone 15 mg oral tablet: 1 tab(s) orally once a day  simvastatin 20 mg oral tablet: 1 tab(s) orally once a day (at bedtime)

## 2022-01-26 NOTE — PROGRESS NOTE ADULT - ASSESSMENT
60yo obese F with PMH of Type 2 DM on insulin, HTN, HLD who presents with 1 week of dry cough, intermittent chest pain induced by coughing, myalgias admitted with acute respiratory failure with hypoxia sec to COVID PNA.    #Acute respiratory failure with hypoxia.   due to COVID PNA (pt unvaccinated)  maintain sats above 88% with O2 supplemental support.   taper down O2 as able, currently satting well on RA, desats noted to 87% on ambulation, will need home oxygen on DC tomorrow  CTA did not show PE, but consistent with COVID PNA  cont decadron 6mg daily (day 10/10)  completed remdesivir x5 days  VTE ppx  pulm (Stevo), recs appreciated    #JING.   Cr up to 1.4 on 1/23 from ~0.8 a couple of days prior ; now improving 1.0 today  likely multifactorial - might be somewhat pre-renal with contribution to JING from HCTZ and losartan  hold HCTZ and losartan  nephro consulted (Mike/Rahul group), recs appreciated - nephro ordered gentle IV hydration with NS at 50cc/hr for 1 day    #Chest pain.   may be MSK from cough or could be reflux   EKG performed and was NSR similar to admission EKG without signs of acute ischemia   r/out cardiac ischemia with Rolly -- first set showing CK: 419; CKMB: 4.1; HS Troponin: 11.9 -- repeat Rolly in 6 hours were the same with very low HStroponin - ACS ruled out  also checked lipase which was normal  given Maalox and had improvement, so was likely acid reflux  monitor closely    #Type 2 diabetes mellitus.   chronic   low dose lispro ISS   continue with hypoglycemic protocol  diabetic diet.    #HTN (hypertension).   cont home meds with hold parameters  Amlodipine 10mg po daily   hold HCTZ and losartan due to recovering JING   increase to hydralazine 50mg po TID   Metoprolol 50mg po BID    #HLD (hyperlipidemia).   chronic  Aspirin 81mg daily   Simvastatin 20mg po daily     #Need for prophylactic measure.   VTE ppx: lovenox 40mg sq BID ; d-dimer has trended down and normalized so off full dose A/C  GI ppx with protonix    #Disp.  d/c home when able to wean down O2 support to 2-4L NC

## 2022-01-26 NOTE — DISCHARGE NOTE PROVIDER - NSDCCPCAREPLAN_GEN_ALL_CORE_FT
PRINCIPAL DISCHARGE DIAGNOSIS  Diagnosis: 2019 novel coronavirus disease (COVID-19)  Assessment and Plan of Treatment: -Please continue isolation for a minimum of 10 days from symptom onset and until cleared by health care provider or [facility]   - Continue supportive care with plenty of hydration and rest. If you are not eating or drinking well, you can purchase over the counter electrolyte beverages including sports drinks or Pedialtye  - Avoid all NSAID's. Examples of this drug class include ibuprofen (generic, Advil, Motrin), naproxen (generic, Aleve), diclofenac (generic, Voltaren, Arthrotec), celecoxib (Celebrex), meloxicam (Mobicox) and many others. You can take Tylenol (acetaminophen) as needed for pain or fevers  - Return to the ED if you have worsening symptoms including high fevers, shortness of breath or feel like you are going to pass out.  - continue home oxygen as prescribed      SECONDARY DISCHARGE DIAGNOSES  Diagnosis: Type 2 diabetes mellitus  Assessment and Plan of Treatment: Your HbA1C was 6.7. You do NOT need to be on insulin, and diabetic education was provided. You preferred to continue with your home regimen which included insulin, against medical advice. Be sure to follow closely with your PMD and endocrinologist.

## 2022-01-26 NOTE — DISCHARGE NOTE PROVIDER - ATTENDING DISCHARGE PHYSICAL EXAMINATION:
T(C): 36.7 (01-27-22 @ 11:37), Max: 36.9 (01-26-22 @ 12:17)  HR: 85 (01-27-22 @ 11:37) (71 - 85)  BP: 157/76 (01-27-22 @ 11:37) (103/76 - 159/78)  RR: 18 (01-27-22 @ 11:37) (18 - 18)  SpO2: 95% (01-27-22 @ 11:37) (93% - 96%)    Physical Exam:  General: Well developed, well nourished, NAD  HEENT: NC/AT, EOMI B/L, moist mucous membranes   Neck: Supple, nontender, no masses  CV: RRR, +S1/S2, no murmurs, rubs or gallops  Respiratory: diminished bs b/l, otherwise  CTA B/L, No W/R/R  Abdominal: Soft, NT, ND +BSx4  Extremities: No C/C/E, + peripheral pulses  Neurology: AAOx3, nonfocal

## 2022-01-26 NOTE — DISCHARGE NOTE PROVIDER - HOSPITAL COURSE
FROM ADMISSION H+P:   HPI:  62 yo obese F with pmh Dm2 on insulin. HTN, HLD who presents with 1 week of dry cough, intermittent chest pain induced by coughing, myalgias and temps of 100. Pt states her son is also covid +. States she has note received covid vaccine nor wants it if offered as for Moravian reasons as a Anabaptist her priests have told her not to get it. Pt denies any other acute complaints at this time.     In the ED pt found to be hypoxic in the 80s, started on O2 supplementation, satting 91% on 5.5 L of NC O2 with some mild wob on exertion. CXR c/w COVID PNA. CTA neg for PE (17 Jan 2022 18:42)      ---  HOSPITAL COURSE:     ---  CONSULTANTS:     ---     FROM ADMISSION H+P:   HPI:  62 yo obese F with pmh Dm2 on insulin. HTN, HLD who presents with 1 week of dry cough, intermittent chest pain induced by coughing, myalgias and temps of 100. Pt states her son is also covid +. States she has note received covid vaccine nor wants it if offered as for Pentecostalism reasons as a Jain her priests have told her not to get it. Pt denies any other acute complaints at this time.     In the ED pt found to be hypoxic in the 80s, started on O2 supplementation, satting 91% on 5.5 L of NC O2 with some mild wob on exertion. CXR c/w COVID PNA. CTA neg for PE (17 Jan 2022 18:42)      ---  HOSPITAL COURSE: 60yo obese F with PMH of Type 2 DM on insulin, HTN, HLD who presents with 1 week of dry cough, intermittent chest pain induced by coughing, myalgias admitted with acute respiratory failure with hypoxia sec to COVID PNA.    #Acute respiratory failure with hypoxia.   due to COVID PNA (pt unvaccinated)  maintain sats above 88% with O2 supplemental support.   taper down O2 as able, currently satting well on RA, desats noted to 87% on ambulation, will need home oxygen on DC tomorrow  CTA did not show PE, but consistent with COVID PNA  cont decadron 6mg daily (day 10/10)  completed remdesivir x5 days  VTE ppx  pulm (Ste[facility]), recs appreciated    #JING.   Cr up to 1.4 on 1/23 from ~0.8 a couple of days prior ; now improving 1.0 today  likely multifactorial - might be somewhat pre-renal with contribution to JING from HCTZ and losartan  hold HCTZ and losartan  nephro consulted ([facility]), recs appreciated - nephro ordered gentle IV hydration with NS at 50cc/hr for 1 day    #Chest pain.   may be MSK from cough or could be reflux   EKG performed and was NSR similar to admission EKG without signs of acute ischemia   r/out cardiac ischemia with Rolly -- first set showing CK: 419; CKMB: 4.1; HS Troponin: 11.9 -- repeat Rolly in 6 hours were the same with very low HStroponin - ACS ruled out  also checked lipase which was normal  given Maalox and had improvement, so was likely acid reflux  monitor closely    #Type 2 diabetes mellitus.   chronic   low dose lispro ISS   continue with hypoglycemic protocol  diabetic diet.    #HTN (hypertension).   cont home meds with hold parameters  Amlodipine 10mg po daily   hold HCTZ and losartan due to recovering JING   increase to hydralazine 50mg po TID   Metoprolol 50mg po BID    #HLD (hyperlipidemia).   chronic  Aspirin 81mg daily   Simvastatin 20mg po daily       ---  CONSULTANTS:   ID  ---

## 2022-01-26 NOTE — PROGRESS NOTE ADULT - SUBJECTIVE AND OBJECTIVE BOX
Patient is a 61y old  Female who presents with a chief complaint of cough, covid exposure (26 Jan 2022 14:24)      INTERVAL HPI/OVERNIGHT EVENTS: Pt seen and examined at bedside. has no complaints, states breathing improved.     MEDICATIONS  (STANDING):  amLODIPine   Tablet 10 milliGRAM(s) Oral daily  aspirin enteric coated 81 milliGRAM(s) Oral daily  benzonatate 100 milliGRAM(s) Oral three times a day  dextrose 40% Gel 15 Gram(s) Oral once  dextrose 5%. 1000 milliLiter(s) (50 mL/Hr) IV Continuous <Continuous>  dextrose 5%. 1000 milliLiter(s) (100 mL/Hr) IV Continuous <Continuous>  dextrose 50% Injectable 25 Gram(s) IV Push once  dextrose 50% Injectable 12.5 Gram(s) IV Push once  dextrose 50% Injectable 25 Gram(s) IV Push once  enoxaparin Injectable 40 milliGRAM(s) SubCutaneous two times a day  glucagon  Injectable 1 milliGRAM(s) IntraMuscular once  hydrALAZINE 50 milliGRAM(s) Oral two times a day  insulin lispro (ADMELOG) corrective regimen sliding scale   SubCutaneous three times a day before meals  metoprolol tartrate 50 milliGRAM(s) Oral two times a day  pantoprazole    Tablet 40 milliGRAM(s) Oral before breakfast  simvastatin 20 milliGRAM(s) Oral at bedtime    MEDICATIONS  (PRN):  aluminum hydroxide/magnesium hydroxide/simethicone Suspension 30 milliLiter(s) Oral every 4 hours PRN Dyspepsia  guaiFENesin Oral Liquid (Sugar-Free) 200 milliGRAM(s) Oral every 6 hours PRN Cough      Allergies    No Known Allergies    Intolerances        REVIEW OF SYSTEMS:  CONSTITUTIONAL: No fever or chills  HEENT:  No headache, no sore throat  RESPIRATORY: No cough, wheezing, or shortness of breath  CARDIOVASCULAR: No chest pain, palpitations, or leg swelling  GASTROINTESTINAL: No abd pain, nausea, vomiting, or diarrhea  GENITOURINARY: No dysuria, frequency, or hematuria  NEUROLOGICAL: no focal weakness or dizziness  MUSCULOSKELETAL: no myalgias     Vital Signs Last 24 Hrs  T(C): 36.9 (26 Jan 2022 12:17), Max: 36.9 (26 Jan 2022 12:17)  T(F): 98.4 (26 Jan 2022 12:17), Max: 98.4 (26 Jan 2022 12:17)  HR: 82 (26 Jan 2022 12:17) (70 - 82)  BP: 152/76 (26 Jan 2022 12:17) (152/76 - 167/79)  BP(mean): --  RR: 18 (26 Jan 2022 12:17) (17 - 18)  SpO2: 93% (26 Jan 2022 12:17) (93% - 96%)    PHYSICAL EXAM:  GENERAL: F in NAD  HEENT:  NC/AT, EOMI, moist mucous membranes  CHEST/LUNG:  diminished bs b/l, otherwise CTA b/l, no rales, wheezes, or rhonchi  HEART:  RRR, S1, S2  ABDOMEN:  BS+, soft, nontender, nondistended  EXTREMITIES: no edema, cyanosis, or calf tenderness  NERVOUS SYSTEM: AA&Ox3    LABS:                        12.9   9.99  )-----------( 289      ( 26 Jan 2022 07:24 )             36.1     CBC Full  -  ( 26 Jan 2022 07:24 )  WBC Count : 9.99 K/uL  Hemoglobin : 12.9 g/dL  Hematocrit : 36.1 %  Platelet Count - Automated : 289 K/uL  Mean Cell Volume : 84.5 fl  Mean Cell Hemoglobin : 30.2 pg  Mean Cell Hemoglobin Concentration : 35.7 gm/dL  Auto Neutrophil # : x  Auto Lymphocyte # : x  Auto Monocyte # : x  Auto Eosinophil # : x  Auto Basophil # : x  Auto Neutrophil % : x  Auto Lymphocyte % : x  Auto Monocyte % : x  Auto Eosinophil % : x  Auto Basophil % : x    26 Jan 2022 07:24    136    |  105    |  26     ----------------------------<  69     3.9     |  25     |  0.86     Ca    9.0        26 Jan 2022 07:24    TPro  6.1    /  Alb  2.6    /  TBili  0.6    /  DBili  0.2    /  AST  29     /  ALT  55     /  AlkPhos  42     26 Jan 2022 07:24    PT/INR - ( 26 Jan 2022 07:24 )   PT: 11.1 sec;   INR: 0.95 ratio             CAPILLARY BLOOD GLUCOSE      POCT Blood Glucose.: 111 mg/dL (26 Jan 2022 16:52)  POCT Blood Glucose.: 148 mg/dL (26 Jan 2022 11:27)  POCT Blood Glucose.: 147 mg/dL (26 Jan 2022 08:24)  POCT Blood Glucose.: 82 mg/dL (26 Jan 2022 08:06)  POCT Blood Glucose.: 59 mg/dL (26 Jan 2022 07:45)  POCT Blood Glucose.: 62 mg/dL (26 Jan 2022 07:44)          RADIOLOGY & ADDITIONAL TESTS:    Personally reviewed.     Consultant(s) Notes Reviewed:  [x] YES  [ ] NO    Care Discussed with [x] Consultants  [x] Patient  [ ] Family  [ ]      [ ] Other; RN  DVT ppx

## 2022-01-26 NOTE — DISCHARGE NOTE PROVIDER - CARE PROVIDER_API CALL
SAW GUERRERO  Internal Medicine  [id number] [place] [address]  Phone: [phone]  Fax: [phone]  Follow Up Time:

## 2022-01-26 NOTE — PROGRESS NOTE ADULT - SUBJECTIVE AND OBJECTIVE BOX
Date/Time Patient Seen:  		  Referring MD:   Data Reviewed	       Patient is a 61y old  Female who presents with a chief complaint of acute hypoxic resp failure due to COVID PNA (25 Jan 2022 11:32)      Subjective/HPI     PAST MEDICAL & SURGICAL HISTORY:  HTN (hypertension)  resistant type,, &gt;3 bp meds    DM (diabetes mellitus)  type 2-insulin dependenct    No significant past surgical history          Medication list         MEDICATIONS  (STANDING):  amLODIPine   Tablet 10 milliGRAM(s) Oral daily  aspirin enteric coated 81 milliGRAM(s) Oral daily  benzonatate 100 milliGRAM(s) Oral three times a day  dextrose 40% Gel 15 Gram(s) Oral once  dextrose 5%. 1000 milliLiter(s) (50 mL/Hr) IV Continuous <Continuous>  dextrose 5%. 1000 milliLiter(s) (100 mL/Hr) IV Continuous <Continuous>  dextrose 50% Injectable 25 Gram(s) IV Push once  dextrose 50% Injectable 12.5 Gram(s) IV Push once  dextrose 50% Injectable 25 Gram(s) IV Push once  enoxaparin Injectable 40 milliGRAM(s) SubCutaneous two times a day  glucagon  Injectable 1 milliGRAM(s) IntraMuscular once  hydrALAZINE 50 milliGRAM(s) Oral two times a day  insulin lispro (ADMELOG) corrective regimen sliding scale   SubCutaneous three times a day before meals  metoprolol tartrate 50 milliGRAM(s) Oral two times a day  pantoprazole    Tablet 40 milliGRAM(s) Oral before breakfast  simvastatin 20 milliGRAM(s) Oral at bedtime    MEDICATIONS  (PRN):  aluminum hydroxide/magnesium hydroxide/simethicone Suspension 30 milliLiter(s) Oral every 4 hours PRN Dyspepsia  guaiFENesin Oral Liquid (Sugar-Free) 200 milliGRAM(s) Oral every 6 hours PRN Cough         Vitals log        ICU Vital Signs Last 24 Hrs  T(C): 36.6 (26 Jan 2022 04:38), Max: 36.6 (25 Jan 2022 21:17)  T(F): 97.8 (26 Jan 2022 04:38), Max: 97.8 (25 Jan 2022 21:17)  HR: 79 (26 Jan 2022 04:38) (65 - 86)  BP: 167/79 (26 Jan 2022 04:38) (131/77 - 174/74)  BP(mean): --  ABP: --  ABP(mean): --  RR: 17 (26 Jan 2022 04:38) (17 - 18)  SpO2: 96% (26 Jan 2022 04:38) (93% - 97%)           Input and Output:  I&O's Detail      Lab Data                        12.7   8.14  )-----------( 320      ( 25 Jan 2022 07:24 )             36.1     01-25    135  |  103  |  29<H>  ----------------------------<  97  3.9   |  26  |  1.00    Ca    9.4      25 Jan 2022 07:24  Mg     2.4     01-25    TPro  6.3  /  Alb  2.6<L>  /  TBili  0.6  /  DBili  0.2  /  AST  38<H>  /  ALT  63  /  AlkPhos  48  01-25      CARDIAC MARKERS ( 24 Jan 2022 07:53 )  x     / x     / 365 U/L / x     / x            Review of Systems	      Objective     Physical Examination    heart s1s2  lung dec BS  abd soft      Pertinent Lab findings & Imaging      Frank:  NO   Adequate UO     I&O's Detail           Discussed with:     Cultures:	        Radiology

## 2022-01-27 VITALS
SYSTOLIC BLOOD PRESSURE: 157 MMHG | OXYGEN SATURATION: 95 % | HEART RATE: 85 BPM | DIASTOLIC BLOOD PRESSURE: 76 MMHG | TEMPERATURE: 98 F | RESPIRATION RATE: 18 BRPM

## 2022-01-27 PROCEDURE — 80048 BASIC METABOLIC PNL TOTAL CA: CPT

## 2022-01-27 PROCEDURE — 85025 COMPLETE CBC W/AUTO DIFF WBC: CPT

## 2022-01-27 PROCEDURE — 82550 ASSAY OF CK (CPK): CPT

## 2022-01-27 PROCEDURE — 84100 ASSAY OF PHOSPHORUS: CPT

## 2022-01-27 PROCEDURE — 71045 X-RAY EXAM CHEST 1 VIEW: CPT

## 2022-01-27 PROCEDURE — 99239 HOSP IP/OBS DSCHRG MGMT >30: CPT

## 2022-01-27 PROCEDURE — 86803 HEPATITIS C AB TEST: CPT

## 2022-01-27 PROCEDURE — 99221 1ST HOSP IP/OBS SF/LOW 40: CPT

## 2022-01-27 PROCEDURE — 82565 ASSAY OF CREATININE: CPT

## 2022-01-27 PROCEDURE — 0225U NFCT DS DNA&RNA 21 SARSCOV2: CPT

## 2022-01-27 PROCEDURE — 83036 HEMOGLOBIN GLYCOSYLATED A1C: CPT

## 2022-01-27 PROCEDURE — 81001 URINALYSIS AUTO W/SCOPE: CPT

## 2022-01-27 PROCEDURE — 82570 ASSAY OF URINE CREATININE: CPT

## 2022-01-27 PROCEDURE — 71275 CT ANGIOGRAPHY CHEST: CPT | Mod: MA

## 2022-01-27 PROCEDURE — 85027 COMPLETE CBC AUTOMATED: CPT

## 2022-01-27 PROCEDURE — 97162 PT EVAL MOD COMPLEX 30 MIN: CPT

## 2022-01-27 PROCEDURE — 93005 ELECTROCARDIOGRAM TRACING: CPT

## 2022-01-27 PROCEDURE — 83735 ASSAY OF MAGNESIUM: CPT

## 2022-01-27 PROCEDURE — 82962 GLUCOSE BLOOD TEST: CPT

## 2022-01-27 PROCEDURE — 86140 C-REACTIVE PROTEIN: CPT

## 2022-01-27 PROCEDURE — 82553 CREATINE MB FRACTION: CPT

## 2022-01-27 PROCEDURE — 85730 THROMBOPLASTIN TIME PARTIAL: CPT

## 2022-01-27 PROCEDURE — 85379 FIBRIN DEGRADATION QUANT: CPT

## 2022-01-27 PROCEDURE — 87086 URINE CULTURE/COLONY COUNT: CPT

## 2022-01-27 PROCEDURE — 99285 EMERGENCY DEPT VISIT HI MDM: CPT

## 2022-01-27 PROCEDURE — 83605 ASSAY OF LACTIC ACID: CPT

## 2022-01-27 PROCEDURE — 36415 COLL VENOUS BLD VENIPUNCTURE: CPT

## 2022-01-27 PROCEDURE — 84145 PROCALCITONIN (PCT): CPT

## 2022-01-27 PROCEDURE — 84484 ASSAY OF TROPONIN QUANT: CPT

## 2022-01-27 PROCEDURE — 82728 ASSAY OF FERRITIN: CPT

## 2022-01-27 PROCEDURE — 84300 ASSAY OF URINE SODIUM: CPT

## 2022-01-27 PROCEDURE — 83690 ASSAY OF LIPASE: CPT

## 2022-01-27 PROCEDURE — 80053 COMPREHEN METABOLIC PANEL: CPT

## 2022-01-27 PROCEDURE — 87040 BLOOD CULTURE FOR BACTERIA: CPT

## 2022-01-27 PROCEDURE — 85610 PROTHROMBIN TIME: CPT

## 2022-01-27 PROCEDURE — 80076 HEPATIC FUNCTION PANEL: CPT

## 2022-01-27 RX ORDER — INSULIN NPH HUM/REG INSULIN HM 70-30/ML
0 VIAL (ML) SUBCUTANEOUS
Qty: 0 | Refills: 0 | DISCHARGE

## 2022-01-27 RX ADMIN — PANTOPRAZOLE SODIUM 40 MILLIGRAM(S): 20 TABLET, DELAYED RELEASE ORAL at 05:41

## 2022-01-27 RX ADMIN — Medication 50 MILLIGRAM(S): at 05:41

## 2022-01-27 RX ADMIN — Medication 81 MILLIGRAM(S): at 11:34

## 2022-01-27 RX ADMIN — ENOXAPARIN SODIUM 40 MILLIGRAM(S): 100 INJECTION SUBCUTANEOUS at 05:41

## 2022-01-27 RX ADMIN — Medication 100 MILLIGRAM(S): at 05:41

## 2022-01-27 RX ADMIN — AMLODIPINE BESYLATE 10 MILLIGRAM(S): 2.5 TABLET ORAL at 05:41

## 2022-01-27 NOTE — PROGRESS NOTE ADULT - PROVIDER SPECIALTY LIST ADULT
Hospitalist
Infectious Disease
Pulmonology
Hospitalist
Hospitalist
Pulmonology
Hospitalist
Pulmonology
Hospitalist
Hospitalist
Infectious Disease
Pulmonology
Hospitalist

## 2022-01-27 NOTE — PROVIDER CONTACT NOTE (HYPOGLYCEMIA EVENT) - NS PROVIDER CONTACT BACKGROUND-HYPO
Age: 61y    Gender: Female    POCT Blood Glucose:  58 mg/dL (01-27-22 @ 07:42)  99 mg/dL (01-26-22 @ 21:11)  111 mg/dL (01-26-22 @ 16:52)  148 mg/dL (01-26-22 @ 11:27)  147 mg/dL (01-26-22 @ 08:24)  82 mg/dL (01-26-22 @ 08:06)      eMAR:  simvastatin   20 milliGRAM(s) Oral (01-26-22 @ 21:58)    
Age: 61y    Gender: Female    POCT Blood Glucose:  147 mg/dL (01-26-22 @ 08:24)  82 mg/dL (01-26-22 @ 08:06)  59 mg/dL (01-26-22 @ 07:45)  62 mg/dL (01-26-22 @ 07:44)  193 mg/dL (01-25-22 @ 17:05)  162 mg/dL (01-25-22 @ 11:25)      eMAR:dexAMETHasone     Tablet   6 milliGRAM(s) Oral (01-26-22 @ 05:10)    insulin lispro (ADMELOG) corrective regimen sliding scale   1 Unit(s) SubCutaneous (01-25-22 @ 17:14)   1 Unit(s) SubCutaneous (01-25-22 @ 11:26)    simvastatin   20 milliGRAM(s) Oral (01-25-22 @ 21:16)    
Age: 61y    Gender: Female    POCT Blood Glucose:  117 mg/dL (01-22-22 @ 08:37)  66 mg/dL (01-22-22 @ 08:07)  68 mg/dL (01-22-22 @ 08:06)  222 mg/dL (01-21-22 @ 22:14)  141 mg/dL (01-21-22 @ 16:52)  131 mg/dL (01-21-22 @ 11:30)      eMAR:dexAMETHasone     Tablet   6 milliGRAM(s) Oral (01-22-22 @ 06:11)    simvastatin   20 milliGRAM(s) Oral (01-21-22 @ 22:09)

## 2022-01-27 NOTE — CONSULT NOTE ADULT - PROBLEM SELECTOR RECOMMENDATION 9
Type 2 A1c 6.7% adm  FU appt: Feb 2022 scheduled with PCP  DSC recommendations: return to home regimen and glucose monitoring  diabetes education provided  Diabetes support info and cell # [phone] given   Goal 100-180 mg/dL; 140-180 mg/dL in critical care areas

## 2022-01-27 NOTE — PROVIDER CONTACT NOTE (HYPOGLYCEMIA EVENT) - NS PROVIDER CONTACT NOTE-TREATMENT-HYPO
4 oz Fruit Juice (Specify quantity, date/time)
breakfast given/4 oz Fruit Juice (Specify quantity, date/time)/Other (Specify)
4 oz Fruit Juice (Specify quantity, date/time)

## 2022-01-27 NOTE — PROGRESS NOTE ADULT - ASSESSMENT
62 yo obese F with pmh Dm2 on insulin. HTN, HLD who presents with 1 week of dry cough, intermittent chest pain induced by coughing, myalgias admitted with acute respiratory failure with hypoxia sec to COVID PNA      tolerating lower fio2 - plan for home with o2 support - keep sat > 88 pct -   Renal cx noted - s/p IVF - JING eval - serial renal indices  ID eval noted  dvt p - lovenox  BP optimization in progress  repeat D dimer noted    serial D dimer  isolation for Covid  robitussin - tylenol for sx management  remdesivir - decadron - covid with hypoxemia  ct chest - viral pna  fio2 titration - keep sat > 88 pct  dvt p  cvs rx regimen for HLD - HTN -   DM care - serial FS -   education  counseling  emotional support

## 2022-01-27 NOTE — PROVIDER CONTACT NOTE (HYPOGLYCEMIA EVENT) - NS PROVIDER CONTACT ASSESS-HYPO
pt axox4. denies s/s of hypoglycemia
Pt provided juice and reassessed, b/g 102, insulin discontinued my MD. 
Pt asymptomatic

## 2022-01-27 NOTE — CONSULT NOTE ADULT - CONSULT REASON
JING
61y A1C with Estimated Average Glucose Result: 6.7 % (01-18-22 @ 10:34)   diabetes mellitus uncontrolled type 2
constitutional sx  covid exp
COVID-19 Pneumonia

## 2022-01-27 NOTE — PROGRESS NOTE ADULT - SUBJECTIVE AND OBJECTIVE BOX
Date/Time Patient Seen:  		  Referring MD:   Data Reviewed	       Patient is a 61y old  Female who presents with a chief complaint of cough, covid exposure (26 Jan 2022 20:28)      Subjective/HPI     PAST MEDICAL & SURGICAL HISTORY:  HTN (hypertension)  resistant type,, &gt;3 bp meds    DM (diabetes mellitus)  type 2-insulin dependenct    No significant past surgical history          Medication list         MEDICATIONS  (STANDING):  amLODIPine   Tablet 10 milliGRAM(s) Oral daily  aspirin enteric coated 81 milliGRAM(s) Oral daily  benzonatate 100 milliGRAM(s) Oral three times a day  dextrose 40% Gel 15 Gram(s) Oral once  dextrose 5%. 1000 milliLiter(s) (50 mL/Hr) IV Continuous <Continuous>  dextrose 5%. 1000 milliLiter(s) (100 mL/Hr) IV Continuous <Continuous>  dextrose 50% Injectable 25 Gram(s) IV Push once  dextrose 50% Injectable 12.5 Gram(s) IV Push once  dextrose 50% Injectable 25 Gram(s) IV Push once  enoxaparin Injectable 40 milliGRAM(s) SubCutaneous two times a day  glucagon  Injectable 1 milliGRAM(s) IntraMuscular once  hydrALAZINE 50 milliGRAM(s) Oral two times a day  insulin lispro (ADMELOG) corrective regimen sliding scale   SubCutaneous three times a day before meals  metoprolol tartrate 50 milliGRAM(s) Oral two times a day  pantoprazole    Tablet 40 milliGRAM(s) Oral before breakfast  simvastatin 20 milliGRAM(s) Oral at bedtime    MEDICATIONS  (PRN):  aluminum hydroxide/magnesium hydroxide/simethicone Suspension 30 milliLiter(s) Oral every 4 hours PRN Dyspepsia  guaiFENesin Oral Liquid (Sugar-Free) 200 milliGRAM(s) Oral every 6 hours PRN Cough         Vitals log        ICU Vital Signs Last 24 Hrs  T(C): 36.7 (27 Jan 2022 04:54), Max: 36.9 (26 Jan 2022 12:17)  T(F): 98 (27 Jan 2022 04:54), Max: 98.4 (26 Jan 2022 12:17)  HR: 85 (27 Jan 2022 04:54) (70 - 85)  BP: 103/76 (27 Jan 2022 04:54) (103/76 - 159/78)  BP(mean): --  ABP: --  ABP(mean): --  RR: 18 (27 Jan 2022 04:54) (18 - 18)  SpO2: 96% (27 Jan 2022 04:54) (93% - 96%)           Input and Output:  I&O's Detail      Lab Data                        12.9   9.99  )-----------( 289      ( 26 Jan 2022 07:24 )             36.1     01-26    136  |  105  |  26<H>  ----------------------------<  69<L>  3.9   |  25  |  0.86    Ca    9.0      26 Jan 2022 07:24  Mg     2.4     01-25    TPro  6.1  /  Alb  2.6<L>  /  TBili  0.6  /  DBili  0.2  /  AST  29  /  ALT  55  /  AlkPhos  42  01-26            Review of Systems	      Objective     Physical Examination    heart s1s2  lung dec BS  abd soft      Pertinent Lab findings & Imaging      Frank:  NO   Adequate UO     I&O's Detail           Discussed with:     Cultures:	        Radiology

## 2022-01-27 NOTE — PROGRESS NOTE ADULT - REASON FOR ADMISSION
acute hypoxic resp failure due to COVID PNA
cough, covid exposure
acute hypoxic resp failure due to COVID PNA
cough, covid exposure
acute hypoxic resp failure due to COVID PNA
cough, covid exposure
acute hypoxic resp failure due to COVID PNA
acute hypoxic resp failure due to COVID PNA
cough, covid exposure
acute hypoxic resp failure due to COVID PNA
acute hypoxic resp failure due to COVID PNA

## 2022-01-27 NOTE — DISCHARGE NOTE NURSING/CASE MANAGEMENT/SOCIAL WORK - PATIENT PORTAL LINK FT
You can access the FollowMyHealth Patient Portal offered by [facility] by registering at the following website: [email or web address]. By joining [email or web address]’s FollowMyHealth portal, you will also be able to view your health information using other applications (apps) compatible with our system.

## 2022-01-27 NOTE — CONSULT NOTE ADULT - ASSESSMENT
Physical Exam:   Vital Signs Last 24 Hrs  T(C): 36.7 (27 Jan 2022 04:54), Max: 36.9 (26 Jan 2022 12:17)  T(F): 98 (27 Jan 2022 04:54), Max: 98.4 (26 Jan 2022 12:17)  HR: 71 (27 Jan 2022 05:40) (71 - 85)  BP: 154/75 (27 Jan 2022 05:40) (103/76 - 159/78)  BP(mean): --  RR: 18 (27 Jan 2022 04:54) (18 - 18)  SpO2: 96% (27 Jan 2022 04:54) (93% - 96%)    General: NAD, denies Fever, chills  CVS: S1S2 no M/R/G  Resp: CTA in all fields  : no freq, no urgency, no dysuria  Extremeties: no edema, + pulses     eGFR if Non African American: 72 mL/min/1.73M2 (01-26-22 @ 07:24)  eGFR if : 83 mL/min/1.73M2 (01-26-22 @ 07:24)  eGFR if Non African American: 72 mL/min/1.73M2 (01-26-22 @ 07:24)  eGFR if : 83 mL/min/1.73M2 (01-26-22 @ 07:24)  eGFR if Non African American: 60 mL/min/1.73M2 (01-25-22 @ 07:24)  eGFR if : 69 mL/min/1.73M2 (01-25-22 @ 07:24)      CAPILLARY BLOOD GLUCOSE      POCT Blood Glucose.: 102 mg/dL (27 Jan 2022 08:07)  POCT Blood Glucose.: 58 mg/dL (27 Jan 2022 07:42)  POCT Blood Glucose.: 99 mg/dL (26 Jan 2022 21:11)  POCT Blood Glucose.: 111 mg/dL (26 Jan 2022 16:52)  POCT Blood Glucose.: 148 mg/dL (26 Jan 2022 11:27)      Cholesterol, Serum: 113 mg/dL (05.19.21 @ 08:36)     HDL Cholesterol, Serum: 22 mg/dL (05.19.21 @ 08:36)     LDL Cholesterol Calculated: 66 mg/dL (05.19.21 @ 08:36)     DIET: CC  >50%        
62 yo obese F with pmh Dm2 on insulin. HTN, HLD who presents with 1 week of dry cough, intermittent chest pain induced by coughing, myalgias admitted with acute respiratory failure with hypoxia sec to COVID PNA      serial D dimer  isolation for Covid  robitussin - tylenol for sx management  remdesivir - decadron - covid with hypoxemia  ct chest - viral pna  fio2 titration - keep sat > 88 pct  dvt p  cvs rx regimen for HLD - HTN -   DM care - serial FS -   education  counseling  emotional support

## 2022-01-27 NOTE — CONSULT NOTE ADULT - SUBJECTIVE AND OBJECTIVE BOX
Date/Time Patient Seen:  		  Referring MD:   Data Reviewed	       Patient is a 61y old  Female who presents with a chief complaint of cough, covid exposure (17 Jan 2022 18:42)      Subjective/HPI  vs noted  labs reviewed  H and P reviewed  ER provider note reviewed  covid pos  ct chest reviewed     Reason for Admission: cough, covid exposure  History of Present Illness:   60 yo obese F with pmh Dm2 on insulin. HTN, HLD who presents with 1 week of dry cough, intermittent chest pain induced by coughing, myalgias and temps of 100. Pt states her son is also covid +. States she has note received covid vaccine nor wants it if offered as for Synagogue reasons as a Taoist her priests have told her not to get it. Pt denies any other acute complaints at this time.     In the ED pt found to be hypoxic in the 80s, started on O2 supplementation, satting 91% on 5.5 L of NC O2 with some mild wob on exertion. CXR c/w COVID PNA. CTA neg for PE  PAST MEDICAL & SURGICAL HISTORY:  HTN (hypertension)  resistant type,, &gt;3 bp meds    DM (diabetes mellitus)  type 2-insulin dependenct    No significant past surgical history    FAMILY HISTORY:  Father  Still living? Unknown  FH: HTN (hypertension), Age at diagnosis: Age Unknown  FHx: type 2 diabetes mellitus, Age at diagnosis: Age Unknown    Mother  Still living? No  FH: HTN (hypertension), Age at diagnosis: Age Unknown  FHx: type 2 diabetes mellitus, Age at diagnosis: Age Unknown.     Social History:  Social History (marital status, living situation, occupation, tobacco use, alcohol and drug use, and sexual history): lives in a home in [place] with her two children in their 20s to 30s, nonsmoker, denies etoh use, denies ilicit drug use, retired , , has not had the flu shot, thinks she may have a PNA vax, declines covid vaccine for Synagogue reasons and not interested in pursuing     Tobacco Screening:  · Core Measure Site	Yes  · Has the patient used tobacco in the past 30 days?	No     Risk Assessment:    Present on Admission:  Deep Venous Thrombosis	no   Pulmonary Embolus	no      Heart Failure:  Does this patient have a history of or has been diagnosed with heart failure? no.    HIV Screen (per [facility] of [facility], HIV screening must be offered to every individual between ages 13 and 64)	Not applicable (known HIV negative status in last year)         Medication list         MEDICATIONS  (STANDING):  amLODIPine   Tablet 10 milliGRAM(s) Oral daily  aspirin enteric coated 81 milliGRAM(s) Oral daily  dexAMETHasone     Tablet 6 milliGRAM(s) Oral daily  dextrose 40% Gel 15 Gram(s) Oral once  dextrose 5%. 1000 milliLiter(s) (50 mL/Hr) IV Continuous <Continuous>  dextrose 5%. 1000 milliLiter(s) (100 mL/Hr) IV Continuous <Continuous>  dextrose 50% Injectable 25 Gram(s) IV Push once  dextrose 50% Injectable 12.5 Gram(s) IV Push once  dextrose 50% Injectable 25 Gram(s) IV Push once  enoxaparin Injectable 40 milliGRAM(s) SubCutaneous daily  glucagon  Injectable 1 milliGRAM(s) IntraMuscular once  hydrochlorothiazide 25 milliGRAM(s) Oral daily  insulin lispro (ADMELOG) corrective regimen sliding scale   SubCutaneous three times a day before meals  insulin lispro (ADMELOG) corrective regimen sliding scale   SubCutaneous at bedtime  losartan 100 milliGRAM(s) Oral daily  metoprolol tartrate 50 milliGRAM(s) Oral two times a day  remdesivir  IVPB 100 milliGRAM(s) IV Intermittent every 24 hours  remdesivir  IVPB   IV Intermittent     MEDICATIONS  (PRN):         Vitals log        ICU Vital Signs Last 24 Hrs  T(C): 36.6 (18 Jan 2022 03:34), Max: 37.4 (17 Jan 2022 13:06)  T(F): 97.9 (18 Jan 2022 03:34), Max: 99.4 (17 Jan 2022 13:06)  HR: 75 (18 Jan 2022 03:34) (75 - 82)  BP: 164/74 (18 Jan 2022 03:34) (161/77 - 170/82)  BP(mean): --  ABP: --  ABP(mean): --  RR: 18 (18 Jan 2022 03:34) (16 - 18)  SpO2: 94% (18 Jan 2022 03:34) (85% - 100%)           Input and Output:  I&O's Detail      Lab Data                        13.0   4.98  )-----------( 169      ( 17 Jan 2022 14:09 )             37.4     01-18    x   |  x   |  x   ----------------------------<  x   x    |  x   |  0.90    Ca    8.8      17 Jan 2022 14:09    TPro  6.7  /  Alb  2.5<L>  /  TBili  0.3  /  DBili  <0.1  /  AST  81<H>  /  ALT  49  /  AlkPhos  50  01-18            Review of Systems	  weakness  cough      Objective     Physical Examination    heart s1s2  lung dec BS  abd soft  head nc  on o2 support      Pertinent Lab findings & Imaging      Marie:  NO   Adequate UO     I&O's Detail           Discussed with:     Cultures:	        Radiology      ACC: [id number] EXAM:  CT ANGIO CHEST PULM ART [facility]                          PROCEDURE DATE:  01/17/2022          INTERPRETATION:  Reason for Exam: Shortness of breath. chest pain.    CTA of the chest was performed from the thoracic inlet to the level of   the adrenal glands following IV contrast injection of  80 cc of Omnipaque   350. No immediate complications were reported.  MIP images were also   created and reviewed.    Comparison: Chest x-ray of same date    Tubes/Lines: None    Mediastinum and Heart: Aorta and pulmonary arteries are normal in size.   Thyroid gland is unremarkable. No lymphadenopathy. No pericardial   effusion.    Lungs, Pleura, and Airways: There is no pulmonary embolus. Patchy   bilateral ground glass opacities and consolidations are noted in a   peripheral distribution in keeping with known COVID19 infection.    Visualized Abdomen: Unremarkable.    Bones and soft tissues: Unremarkable.    IMPRESSION:    No pulmonary embolus.    Patchy bilateral ground glass opacities and consolidations are noted in a   peripheral distribution in keeping with known COVID19 infection.    --- End of Report ---            LEVAR LIN MD; Attending Radiologist  This document has been electronically signed. Jan 17 2022  5:26PM                        
HPI:    Patient is a 61y old  Female admitted on  with COVID 19 PNA. following 1 week of cough, myalgia, low grade fever. Despite all therapeutic measures, she remains on high O2 requrements/40% VM.   Her serum Cr has risen from 0.87 to 1.4 this morning. No clear hemodynamic compromise based on review of VS. No IV dye within last 72h. She has remained on HCTZ and ARB till this morning.       PAST MEDICAL & SURGICAL HISTORY:  HTN (hypertension)  resistant type,, &gt;3 bp meds    DM (diabetes mellitus)  type 2-insulin dependenct    No significant past surgical history          FAMILY HISTORY:  FHx: type 2 diabetes mellitus (Father, Mother)  mother and father,  age 70-80s    FH: HTN (hypertension) (Father, Mother)  mother and father,  age 70-80s        Allergies    No Known Allergies            MEDICATIONS  (STANDING):  amLODIPine   Tablet 10 milliGRAM(s) Oral daily  aspirin enteric coated 81 milliGRAM(s) Oral daily  benzonatate 100 milliGRAM(s) Oral three times a day  dexAMETHasone     Tablet 6 milliGRAM(s) Oral daily  dextrose 40% Gel 15 Gram(s) Oral once  dextrose 5%. 1000 milliLiter(s) (50 mL/Hr) IV Continuous <Continuous>  dextrose 5%. 1000 milliLiter(s) (100 mL/Hr) IV Continuous <Continuous>  dextrose 50% Injectable 25 Gram(s) IV Push once  dextrose 50% Injectable 12.5 Gram(s) IV Push once  dextrose 50% Injectable 25 Gram(s) IV Push once  enoxaparin Injectable 80 milliGRAM(s) SubCutaneous two times a day  glucagon  Injectable 1 milliGRAM(s) IntraMuscular once  metoprolol tartrate 50 milliGRAM(s) Oral two times a day  pantoprazole    Tablet 40 milliGRAM(s) Oral before breakfast  simvastatin 20 milliGRAM(s) Oral at bedtime    MEDICATIONS  (PRN):  aluminum hydroxide/magnesium hydroxide/simethicone Suspension 30 milliLiter(s) Oral every 4 hours PRN Dyspepsia  guaiFENesin Oral Liquid (Sugar-Free) 200 milliGRAM(s) Oral every 6 hours PRN Cough      Daily     Daily     Drug Dosing Weight  Height (cm): 165.1 (2022 13:06)  Weight (kg): 79.4 (2022 03:34)  BMI (kg/m2): 29.1 (2022 03:34)  BSA (m2): 1.87 (2022 03:34)      REVIEW OF SYSTEMS:    Per HPI            I&O's Detail        PHYSICAL EXAM:    GENERAL: SOB, on VM  ENMT: moist mucous membranes.   NECK: Supple. No increase in JVP  CHEST/LUNG: Clear to auscultation bilaterally  HEART: Regular rate and rhythm. No murmurs, rubs, or gallops  ABDOMEN: Soft, Nontender, Nondistended. POS BS  EXTREMITIES:  no edema      LABS:  CBC Full  -  ( 2022 09:21 )  WBC Count : 12.22 K/uL  RBC Count : 4.55 M/uL  Hemoglobin : 14.1 g/dL  Hematocrit : 39.0 %  Platelet Count - Automated : 346 K/uL  Mean Cell Volume : 85.7 fl  Mean Cell Hemoglobin : 31.0 pg  Mean Cell Hemoglobin Concentration : 36.2 gm/dL  Auto Neutrophil # : 11.21 K/uL  Auto Lymphocyte # : 0.48 K/uL  Auto Monocyte # : 0.43 K/uL  Auto Eosinophil # : 0.01 K/uL  Auto Basophil # : 0.01 K/uL  Auto Neutrophil % : 91.7 %  Auto Lymphocyte % : 3.9 %  Auto Monocyte % : 3.5 %  Auto Eosinophil % : 0.1 %  Auto Basophil % : 0.1 %        136  |  101  |  36<H>  ----------------------------<  82  4.1   |  29  |  1.40<H>    Ca    9.9      2022 09:21  Phos  3.2       Mg     2.7         TPro  6.9  /  Alb  2.8<L>  /  TBili  0.6  /  DBili  0.2  /  AST  53<H>  /  ALT  72  /  AlkPhos  55      CAPILLARY BLOOD GLUCOSE      POCT Blood Glucose.: 186 mg/dL (2022 12:01)    PT/INR - ( 2022 09:21 )   PT: 12.8 sec;   INR: 1.10 ratio             CARDIAC MARKERS ( 2022 23:47 )  x     / x     / 421 U/L / x     / 4.6 ng/mL  CARDIAC MARKERS ( 2022 18:01 )  x     / x     / 419 U/L / x     / 4.1 ng/mL        Impression:  * JING -- suspect pre-renal azotemia + hemodynamic effect of ARB  * COVID 19 PNA. Remains on high FiO2 requirements.     Recommendations:   * Agree with holding ARB, HCTZ  * Check UA, FeNa  * Consider gentle IV hydration    Thank you!
[facility]  INFECTIOUS DISEASES   [address] [place] [address]  Tel: [phone]     Fax: [phone]  ======================================================  MD Mehnaz Dangelo Kaushal, MD Cho, Michelle, MD   ======================================================      Patient is a 61y old  Female who presents with a chief complaint of cough, covid exposure (2022 05:19)    HPI:  60 yo obese F with pmh Dm2 on insulin. HTN, HLD who presents with 1 week of dry cough, intermittent chest pain induced by coughing, myalgias and temps of 100. Pt states her son is also covid +. States she has note received covid vaccine nor wants it if offered as for Christianity reasons as a Evangelical her priests have told her not to get it. Pt denies any other acute complaints at this time.     In the ED pt found to be hypoxic in the 80s, started on O2 supplementation, satting 91% on 5.5 L of NC O2 with some mild wob on exertion. CXR c/w COVID PNA. CTA neg for PE (2022 18:42)    ID called for further management of COVID-19     PAST MEDICAL & SURGICAL HISTORY:  HTN (hypertension)  resistant type,, &gt;3 bp meds    DM (diabetes mellitus)  type 2-insulin dependenct    No significant past surgical history        SOCIAL HISTORY:    FAMILY HISTORY:  FHx: type 2 diabetes mellitus (Father, Mother)  mother and father,  age 70-80s    FH: HTN (hypertension) (Father, Mother)  mother and father,  age 70-80s        Recent Ill Contacts:	Possibly son with COVID-19   Recent Travel History:	none  Recent Animal/Insect Exposure/Tick Bites: none      REVIEW OF SYSTEMS  11 systems reviewed, no other symptoms except as above      Allergies    No Known Allergies    Intolerances        Antimicrobials:  remdesivir  IVPB 100 milliGRAM(s) IV Intermittent every 24 hours  remdesivir  IVPB   IV Intermittent       Antibiotics Course:  remdesivir  IVPB 100 milliGRAM(s) IV Intermittent every 24 hours  remdesivir  IVPB   IV Intermittent       Other Medications:  amLODIPine   Tablet 10 milliGRAM(s) Oral daily  aspirin enteric coated 81 milliGRAM(s) Oral daily  dexAMETHasone     Tablet 6 milliGRAM(s) Oral daily  dextrose 40% Gel 15 Gram(s) Oral once  dextrose 5%. 1000 milliLiter(s) IV Continuous <Continuous>  dextrose 5%. 1000 milliLiter(s) IV Continuous <Continuous>  dextrose 50% Injectable 25 Gram(s) IV Push once  dextrose 50% Injectable 12.5 Gram(s) IV Push once  dextrose 50% Injectable 25 Gram(s) IV Push once  enoxaparin Injectable 40 milliGRAM(s) SubCutaneous daily  glucagon  Injectable 1 milliGRAM(s) IntraMuscular once  guaiFENesin Oral Liquid (Sugar-Free) 200 milliGRAM(s) Oral every 6 hours PRN  hydrochlorothiazide 25 milliGRAM(s) Oral daily  insulin lispro (ADMELOG) corrective regimen sliding scale   SubCutaneous three times a day before meals  insulin lispro (ADMELOG) corrective regimen sliding scale   SubCutaneous at bedtime  losartan 100 milliGRAM(s) Oral daily  metoprolol tartrate 50 milliGRAM(s) Oral two times a day          Physical Examination:    ICU Vital Signs Last 24 Hrs  T(C): 36.9 (2022 11:32), Max: 37.4 (2022 13:06)  T(F): 98.4 (2022 11:32), Max: 99.4 (2022 13:06)  HR: 75 (2022 11:32) (75 - 82)  BP: 148/72 (2022 11:32) (148/72 - 170/82)  BP(mean): --  ABP: --  ABP(mean): --  RR: 18 (2022 11:32) (16 - 18)  SpO2: 91% (2022 11:32) (85% - 100%)      General: Morbidly obese female sitting in chair with no resp distress at rest but in mild to moderate resp distress after coughing spell or exertion   Neuro: AAO*3, No motor, sensory, or cranial nerve deficit  HEENT: Pupils equal, reactive to light, Oral mucosa moist  PULM: Clear to auscultation bilaterally, no significant adventitious breath sounds   CVS: Regular rhythm and controlled to increased rate  ABD: Soft, nondistended, nontender, normoactive bowel sounds, no CVA tenderness  EXT: No b/l LE edema, nontender with pedal pulse palpable   SKIN: Warm and well perfused, no acute rashes       Lab Results:                        12.8   4.65  )-----------( 175      ( 2022 08:16 )             38.2     -18    139  |  108  |  16  ----------------------------<  118<H>  4.0   |  25  |  0.78    Ca    8.9      2022 08:16    TPro  6.8  /  Alb  2.5<L>  /  TBili  0.4  /  DBili  x   /  AST  87<H>  /  ALT  48  /  AlkPhos  50      LIVER FUNCTIONS - ( 2022 08:16 )  Alb: 2.5 g/dL / Pro: 6.8 g/dL / ALK PHOS: 50 U/L / ALT: 48 U/L / AST: 87 U/L / GGT: x           PT/INR - ( 2022 01:01 )   PT: 11.9 sec;   INR: 1.02 ratio         PTT - ( 2022 14:09 )  PTT:30.2 sec  Urinalysis Basic - ( 2022 21:16 )    Color: Pale Yellow / Appearance: Clear / S.005 / pH: x  Gluc: x / Ketone: Negative  / Bili: Negative / Urobili: Negative   Blood: x / Protein: 100 / Nitrite: Negative   Leuk Esterase: Negative / RBC: 0-2 /HPF / WBC 0-2   Sq Epi: x / Non Sq Epi: Few / Bacteria: Occasional        MICROBIOLOGY/PATHOLOGY/ RADIOLOGY: Reviewed     _________________________    Assessment:   Acute Hypoxic respiratory failure  COVID-19 infection  B/L Viral Pneumonia   Hyperglycemia with underlying DM-2 while on Steroids   HTN  Obesity         - COVID 19 PCR + on 22  - Vaccination status: Unvaccinated   - O2 requirement: Currently on 4-5 LPM NC saturating > 95% at rest while sitting in chair   - Remdesivir treatment: Taper treatment for 5 days started (200 mg LD f/b 100 mg IV daily for another 4 days)   - Dexamethasone 6mg  Daily x 10 days   - Tocilizumab: No indication for now   - Anticoagulation: Lovenox daily  - Microbiology and Radiology reviewed   - Continue supportive care measures including nut not limited to Incentive spirometry, antitussive medications, self proning, etc  - trend CBC with diff, CMP,  CRP, Ferritin,  procalcitonin q 48-72 hours  - Avoid antibiotics unless there is a concern for a bacterial/fungal infection  - follow up all outstanding cultures  - Close and aggressive management of blood sugar, especially while on Steroids  - Airborne and contact isolation for now       COVID-19 management recommendation is based on current rapidly changing literature.         Records, reports from primary team, nursing, consultant reviewed  Case discussed with Primary team         
Patient is a 61y old  Female who presents with a chief complaint of cough, covid exposure (27 Jan 2022 05:37)    Type: DX year known complications Endocrine Last seen Rx home Hx DKA/HHS, Glucometer checks, needs, weight, diet, exercise  diabetes education provided  Hx ASCVD, CKD, HF    HPI:  60 yo obese F with pmh Dm2 on insulin. HTN, HLD who presents with 1 week of dry cough, intermittent chest pain induced by coughing, myalgias and temps of 100. Pt states her son is also covid +. States she has note received covid vaccine nor wants it if offered as for Hoahaoism reasons as a Taoist her priests have told her not to get it. Pt denies any other acute complaints at this time.     In the ED pt found to be hypoxic in the 80s, started on O2 supplementation, satting 91% on 5.5 L of NC O2 with some mild wob on exertion. CXR c/w COVID PNA. CTA neg for PE (17 Jan 2022 18:42)      PAST MEDICAL & SURGICAL HISTORY:  HTN (hypertension)  resistant type,, &gt;3 bp meds    DM (diabetes mellitus)  type 2-insulin dependenct    No significant past surgical history        REVIEW OF SYSTEMS  General:	as above  Respiratory: NAD, No SOB, no cough  Cardiovascular: No chest pain, no palpitations	  Endocrine: no polyuria, no polydipsia, or S/S of hypoglycemia  Neuro: denies numbess, no tingling	  Other:	      Allergies    No Known Allergies    Intolerances        MEDICATIONS  (STANDING):  amLODIPine   Tablet 10 milliGRAM(s) Oral daily  aspirin enteric coated 81 milliGRAM(s) Oral daily  benzonatate 100 milliGRAM(s) Oral three times a day  dextrose 40% Gel 15 Gram(s) Oral once  dextrose 5%. 1000 milliLiter(s) (50 mL/Hr) IV Continuous <Continuous>  dextrose 5%. 1000 milliLiter(s) (100 mL/Hr) IV Continuous <Continuous>  dextrose 50% Injectable 12.5 Gram(s) IV Push once  dextrose 50% Injectable 25 Gram(s) IV Push once  dextrose 50% Injectable 25 Gram(s) IV Push once  enoxaparin Injectable 40 milliGRAM(s) SubCutaneous two times a day  glucagon  Injectable 1 milliGRAM(s) IntraMuscular once  hydrALAZINE 50 milliGRAM(s) Oral two times a day  metoprolol tartrate 50 milliGRAM(s) Oral two times a day  pantoprazole    Tablet 40 milliGRAM(s) Oral before breakfast  simvastatin 20 milliGRAM(s) Oral at bedtime

## 2022-07-08 NOTE — PATIENT PROFILE ADULT - FUNCTIONAL SCREEN CURRENT LEVEL: SWALLOWING (IF SCORE 2 OR MORE FOR ANY ITEM, CONSULT REHAB SERVICES), MLM)
Initiate Treatment: spironolactone 50 mg tablet: Take 1 tab nightly x 1 week. If tolerated well, increase to 2 tabs daily. Detail Level: Simple Plan: Declines accutane treatment today due to side effects 0 = swallows foods/liquids without difficulty